# Patient Record
Sex: MALE | Race: WHITE | NOT HISPANIC OR LATINO | Employment: OTHER | URBAN - NONMETROPOLITAN AREA
[De-identification: names, ages, dates, MRNs, and addresses within clinical notes are randomized per-mention and may not be internally consistent; named-entity substitution may affect disease eponyms.]

---

## 2020-09-09 ENCOUNTER — CONSULT (OUTPATIENT)
Dept: CARDIOLOGY CLINIC | Facility: CLINIC | Age: 60
End: 2020-09-09
Payer: COMMERCIAL

## 2020-09-09 VITALS
HEART RATE: 81 BPM | SYSTOLIC BLOOD PRESSURE: 140 MMHG | WEIGHT: 137 LBS | TEMPERATURE: 96.7 F | DIASTOLIC BLOOD PRESSURE: 82 MMHG | HEIGHT: 71 IN | BODY MASS INDEX: 19.18 KG/M2

## 2020-09-09 DIAGNOSIS — R07.2 PRECORDIAL PAIN: ICD-10-CM

## 2020-09-09 DIAGNOSIS — I73.9 PERIPHERAL VASCULAR DISEASE (HCC): ICD-10-CM

## 2020-09-09 DIAGNOSIS — Z82.49 FAMILY HISTORY OF CORONARY ARTERY DISEASE: Primary | ICD-10-CM

## 2020-09-09 DIAGNOSIS — I10 ESSENTIAL HYPERTENSION: ICD-10-CM

## 2020-09-09 PROCEDURE — 99244 OFF/OP CNSLTJ NEW/EST MOD 40: CPT | Performed by: INTERNAL MEDICINE

## 2020-09-09 PROCEDURE — 93000 ELECTROCARDIOGRAM COMPLETE: CPT | Performed by: INTERNAL MEDICINE

## 2020-09-09 RX ORDER — ASPIRIN 81 MG/1
81 TABLET ORAL DAILY
COMMUNITY
End: 2022-06-12

## 2020-09-09 RX ORDER — LISINOPRIL 30 MG/1
30 TABLET ORAL DAILY
COMMUNITY

## 2020-09-09 NOTE — PROGRESS NOTES
Cardiology Office Visit    Lester Smith  [id number]  1960  [facility]  [address] [place] [address] [phone]      Dear Cesario Thomason MD,    I had the pleasure of seeing your patient at our [address] [place] [address] office today 9/9/2020  As you know he is a pleasant 56y o  year old male with a medical history as described below  Reason for office visit: Evaluation for underlying heart disease  History of PVD s/p bilateral iliac stenting and hypertension  1  Family history of coronary artery disease  Assessment & Plan:  Multiple family members with coronary artery disease  Would recommend aggressive risk factor modification  Orders:  -     POCT ECG  -     Echo complete with contrast if indicated; Future; Expected date: 09/09/2020  -     Echo stress test w contrast if indicated; Future; Expected date: 09/09/2020    2  Precordial pain  Assessment & Plan:  Patient reports intermittent sharp left sided chest pain which are short lived and resolve spontaneously  Strong family history of coronary artery disease  Risk factors for CAD include PVD, HTN and ongoing tobacco use  I have recommended echocardiogram and stress echocardiogram        Orders:  -     Echo complete with contrast if indicated; Future; Expected date: 09/09/2020  -     Echo stress test w contrast if indicated; Future; Expected date: 09/09/2020    3  Peripheral vascular disease [facility])  Assessment & Plan:  Patient with history of peripheral vascular disease  Patient underwent stenting of the bilateral iliac arteries  Patient follows with Dr Candi Betancourt at [facility]  Patient denies any claudication symptoms  Patient should have eventual carotid ultrasound and abdominal ultrasound  Ideally LDL should be < 70 given history of PVD  LDL 8/6/2020 85  Will discuss adding low dose statin at follow up         Orders:  -     Echo complete with contrast if indicated; Future; Expected date: 2020  -     Echo stress test w contrast if indicated; Future; Expected date: 2020    4  Essential hypertension  Assessment & Plan:  Patient is on lisinopril 30 mg daily  Blood pressure is typically well controlled per patient report  Will assess heart rate and blood pressure response to exercise at time of stress echocardiogram    Echocardiogram to assess wall thickness  Orders:  -     Echo complete with contrast if indicated; Future; Expected date: 2020  -     Echo stress test w contrast if indicated; Future; Expected date: 2020           HPI     Patient has a past medical history of hypertension, peripheral vascular disease s/p bilateral iliac stenting, BPH, COPD and osteoarthritis  Patient presents for evaluation of underlying heart disease  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  He walks a lot during the day  He does have occasional sharp stabbing left sided pain without pressure or tightness  He does have some dyspnea on exertion which he attributes to his COPD  He denies any palpitations  No LH/D  No edema  No recurrent claudication since iliac stenting  Energy levels ok  His wife tells him he snores  Patient follows with Dr Janette Celeste at [address] for his peripheral vascular disease  Patient has a strong family history of premature coronary artery disease and is concerned about same  Grandfather  at age 61 of MI, brother  of MI at age 61 and another brother  at the age of 59 from MI  He denies any prior cardiac work up such as stress test or echocardiogram      Patient tells me that he had marked claudication prior to iliac stent and that his feet would turn purple and would feel numb and cold  Patient only takes aspirin 81 mg as well as lisinopril 30 mg for blood pressure  He continues to smoke about one pack of cigarettes per day         Patient Active Problem List   Diagnosis    Family history of coronary artery disease    Hypertension    Precordial pain    Peripheral vascular disease (HCC)    Osteoarthritis    COPD (chronic obstructive pulmonary disease) (HCC)    BPH (benign prostatic hyperplasia)     Past Medical History:   Diagnosis Date    BPH (benign prostatic hyperplasia)     COPD (chronic obstructive pulmonary disease) (HCC)     Hypertension     Osteoarthritis     Peripheral vascular disease (HCC)     stenting b/l iliac stenting 8/12/2019    Severe epistaxis      Social History     Socioeconomic History    Marital status: /Civil Union     Spouse name: Not on file    Number of children: Not on file    Years of education: Not on file    Highest education level: Not on file   Occupational History    Not on file   Social Needs    Financial resource strain: Not on file    Food insecurity     Worry: Not on file     Inability: Not on file    Transportation needs     Medical: Not on file     Non-medical: Not on file   Tobacco Use    Smoking status: Current Every Day Smoker     Packs/day: 1 00     Types: Cigarettes    Smokeless tobacco: Never Used   Substance and Sexual Activity    Alcohol use: Yes     Frequency: 4 or more times a week     Drinks per session: 5 or 6     Comment: 5-6 beers a day    Drug use: Never    Sexual activity: Not on file     Comment: Defer   Lifestyle    Physical activity     Days per week: Not on file     Minutes per session: Not on file    Stress: Not on file   Relationships    Social connections     Talks on phone: Not on file     Gets together: Not on file     Attends [employer] service: Not on file     Active member of club or organization: Not on file     Attends meetings of clubs or organizations: Not on file     Relationship status: Not on file    Intimate partner violence     Fear of current or ex partner: Not on file     Emotionally abused: Not on file     Physically abused: Not on file     Forced sexual activity: Not on file   Other Topics Concern    Not on file   Social History Narrative    Not on file      Family History   Problem Relation Age of Onset    Lymphoma Mother     Colon cancer Father     Heart attack Brother     Heart attack Paternal Grandfather          61    Heart attack Brother     Substance Abuse Brother      Past Surgical History:   Procedure Laterality Date    HERNIA REPAIR      ILIAC VEIN ANGIOPLASTY / STENTING Bilateral 2019    Dr Mel Sears at [address]   NOSE SURGERY      arterial clip due to epistaxis    SKIN CANCER EXCISION      VASECTOMY         Current Outpatient Medications:     aspirin (ECOTRIN LOW STRENGTH) 81 mg EC tablet, Take 81 mg by mouth daily, Disp: , Rfl:     lisinopril (ZESTRIL) 30 mg tablet, Take 30 mg by mouth daily, Disp: , Rfl:   No Known Allergies      Test Results:    EC2020: Normal sinus rhythm  Normal EKG  Lipid panel 2020: C 198  T 66  H 98  L 85  Review of Systems:    Review of Systems   Constitutional: Negative for activity change, appetite change and fatigue  HENT: Negative for congestion, hearing loss, tinnitus and trouble swallowing  Eyes: Negative for visual disturbance  Respiratory: Positive for shortness of breath (dyspnea exertion)  Negative for cough, chest tightness and wheezing  Cardiovascular: Positive for chest pain  Negative for palpitations and leg swelling  Gastrointestinal: Negative for abdominal distention, abdominal pain, nausea and vomiting  Genitourinary: Negative for difficulty urinating  Musculoskeletal: Positive for arthralgias  Skin: Negative for rash  Neurological: Negative for dizziness, syncope and light-headedness  Hematological: Does not bruise/bleed easily  Psychiatric/Behavioral: Negative for confusion  The patient is not nervous/anxious  All other systems reviewed and are negative          Vitals:    20 0820 20 0859   BP: 132/80 140/82   BP Location: Left arm Left arm   Patient Position: Sitting Cuff Size: Standard    Pulse: 81    Temp: (!) 96 7 °F (35 9 °C)    Weight: 62 1 kg (137 lb)    Height: 5' 11" (1 803 m)      Vitals:    09/09/20 0820   Weight: 62 1 kg (137 lb)     Height: 5' 11" (180 3 cm)     Physical Exam   Constitutional: He is oriented to person, place, and time  He appears well-developed and well-nourished  HENT:   Head: Normocephalic and atraumatic  Eyes: Pupils are equal, round, and reactive to light  Conjunctivae are normal    Neck: Normal range of motion  No JVD present  Cardiovascular: Normal rate, regular rhythm and normal heart sounds  Exam reveals no gallop and no friction rub  No murmur heard  Pulmonary/Chest: Effort normal  He has decreased breath sounds  Abdominal: Soft  Bowel sounds are normal    Musculoskeletal:         General: No edema  Neurological: He is alert and oriented to person, place, and time  Skin: Skin is warm and dry  Psychiatric: He has a normal mood and affect  His behavior is normal    Vitals reviewed

## 2020-09-09 NOTE — PATIENT INSTRUCTIONS
Stress echocardiogram and echocardiogram    Eventual carotid and abdominal ultrasound unless done already  Will review records from Dr Nate Simmons as well from [facility] before making any further recommendations

## 2020-09-11 NOTE — ASSESSMENT & PLAN NOTE
Multiple family members with coronary artery disease  Would recommend aggressive risk factor modification

## 2020-09-11 NOTE — ASSESSMENT & PLAN NOTE
Patient reports intermittent sharp left sided chest pain which are short lived and resolve spontaneously  Strong family history of coronary artery disease  Risk factors for CAD include PVD, HTN and ongoing tobacco use     I have recommended echocardiogram and stress echocardiogram

## 2020-09-11 NOTE — ASSESSMENT & PLAN NOTE
Patient with history of peripheral vascular disease  Patient underwent stenting of the bilateral iliac arteries  Patient follows with Dr Candi Betancourt at [address]  Patient denies any claudication symptoms  Patient should have eventual carotid ultrasound and abdominal ultrasound  Ideally LDL should be < 70 given history of PVD  LDL 8/6/2020 85  Will discuss adding low dose statin at follow up

## 2020-09-11 NOTE — ASSESSMENT & PLAN NOTE
Patient is on lisinopril 30 mg daily  Blood pressure is typically well controlled per patient report  Will assess heart rate and blood pressure response to exercise at time of stress echocardiogram    Echocardiogram to assess wall thickness

## 2020-10-22 ENCOUNTER — HOSPITAL ENCOUNTER (OUTPATIENT)
Dept: NON INVASIVE DIAGNOSTICS | Facility: HOSPITAL | Age: 60
Discharge: HOME | End: 2020-10-22
Attending: INTERNAL MEDICINE
Payer: COMMERCIAL

## 2020-10-22 ENCOUNTER — APPOINTMENT (OUTPATIENT)
Dept: NON INVASIVE DIAGNOSTICS | Facility: HOSPITAL | Age: 60
End: 2020-10-22
Attending: INTERNAL MEDICINE
Payer: COMMERCIAL

## 2020-10-22 DIAGNOSIS — R07.2 PRECORDIAL PAIN: ICD-10-CM

## 2020-10-22 DIAGNOSIS — I73.9 PERIPHERAL VASCULAR DISEASE (HCC): ICD-10-CM

## 2020-10-22 DIAGNOSIS — Z82.49 FAMILY HISTORY OF CORONARY ARTERY DISEASE: ICD-10-CM

## 2020-10-22 DIAGNOSIS — I10 ESSENTIAL HYPERTENSION: ICD-10-CM

## 2020-10-22 PROCEDURE — 93350 STRESS TTE ONLY: CPT

## 2020-10-29 LAB
ARRHY DURING EX: NORMAL
CHEST PAIN STATEMENT: NORMAL
MAX DIASTOLIC BP: 90 MMHG
MAX HEART RATE: 164 BPM
MAX PREDICTED HEART RATE: 158 BPM
MAX. SYSTOLIC BP: 180 MMHG
PROTOCOL NAME: NORMAL
REASON FOR TERMINATION: NORMAL
TARGET HR FORMULA: NORMAL
TIME IN EXERCISE PHASE: NORMAL

## 2020-11-03 ENCOUNTER — TELEPHONE (OUTPATIENT)
Dept: CARDIOLOGY CLINIC | Facility: CLINIC | Age: 60
End: 2020-11-03

## 2020-11-04 DIAGNOSIS — I49.3 PVC'S (PREMATURE VENTRICULAR CONTRACTIONS): Primary | ICD-10-CM

## 2020-11-18 ENCOUNTER — OFFICE VISIT (OUTPATIENT)
Dept: CARDIOLOGY CLINIC | Facility: CLINIC | Age: 60
End: 2020-11-18
Payer: COMMERCIAL

## 2020-11-18 VITALS
WEIGHT: 139 LBS | HEIGHT: 72 IN | OXYGEN SATURATION: 100 % | TEMPERATURE: 96.2 F | BODY MASS INDEX: 18.83 KG/M2 | HEART RATE: 84 BPM | DIASTOLIC BLOOD PRESSURE: 80 MMHG | SYSTOLIC BLOOD PRESSURE: 142 MMHG

## 2020-11-18 DIAGNOSIS — I73.9 PERIPHERAL VASCULAR DISEASE (HCC): ICD-10-CM

## 2020-11-18 DIAGNOSIS — I49.1 ATRIAL ECTOPY: ICD-10-CM

## 2020-11-18 DIAGNOSIS — Z82.49 FAMILY HISTORY OF CORONARY ARTERY DISEASE: Primary | ICD-10-CM

## 2020-11-18 DIAGNOSIS — R07.2 PRECORDIAL PAIN: ICD-10-CM

## 2020-11-18 DIAGNOSIS — I10 ESSENTIAL HYPERTENSION: ICD-10-CM

## 2020-11-18 PROBLEM — I49.3 PVC'S (PREMATURE VENTRICULAR CONTRACTIONS): Status: ACTIVE | Noted: 2020-11-18

## 2020-11-18 PROCEDURE — 99214 OFFICE O/P EST MOD 30 MIN: CPT | Performed by: INTERNAL MEDICINE

## 2020-11-18 RX ORDER — GARLIC 200 MG
6000 TABLET ORAL 3 TIMES DAILY
Status: ON HOLD | COMMUNITY
End: 2022-06-12 | Stop reason: CLARIF

## 2020-11-18 RX ORDER — MULTIVITAMIN WITH IRON
250 TABLET ORAL DAILY
Qty: 30 TABLET | Refills: 0 | Status: ON HOLD
Start: 2020-11-18 | End: 2022-06-12 | Stop reason: CLARIF

## 2020-11-18 RX ORDER — DIPHENOXYLATE HYDROCHLORIDE AND ATROPINE SULFATE 2.5; .025 MG/1; MG/1
1 TABLET ORAL DAILY
COMMUNITY

## 2020-11-29 PROBLEM — I49.1 ATRIAL ECTOPY: Status: ACTIVE | Noted: 2020-11-18

## 2020-12-02 ENCOUNTER — CLINICAL SUPPORT (OUTPATIENT)
Dept: CARDIOLOGY CLINIC | Facility: CLINIC | Age: 60
End: 2020-12-02
Payer: COMMERCIAL

## 2020-12-02 DIAGNOSIS — I49.3 PVC'S (PREMATURE VENTRICULAR CONTRACTIONS): ICD-10-CM

## 2020-12-02 PROCEDURE — 0298T PR EXT ECG > 48HR TO 21 DAY REVIEW AND INTERPRETATN: CPT

## 2020-12-02 PROCEDURE — 99211 OFF/OP EST MAY X REQ PHY/QHP: CPT

## 2020-12-10 DIAGNOSIS — I49.3 PVC'S (PREMATURE VENTRICULAR CONTRACTIONS): ICD-10-CM

## 2020-12-10 DIAGNOSIS — R00.0 TACHYCARDIA: Primary | ICD-10-CM

## 2020-12-11 DIAGNOSIS — I10 ESSENTIAL HYPERTENSION: Primary | ICD-10-CM

## 2020-12-11 RX ORDER — METOPROLOL SUCCINATE 25 MG/1
25 TABLET, EXTENDED RELEASE ORAL DAILY
Qty: 30 TABLET | Refills: 11 | OUTPATIENT
Start: 2020-12-11

## 2020-12-11 RX ORDER — METOPROLOL SUCCINATE 25 MG/1
25 TABLET, EXTENDED RELEASE ORAL DAILY
Status: ON HOLD | COMMUNITY
End: 2022-06-12 | Stop reason: CLARIF

## 2020-12-11 RX ORDER — METOPROLOL SUCCINATE 25 MG/1
25 TABLET, EXTENDED RELEASE ORAL DAILY
Qty: 30 TABLET | Refills: 5 | Status: SHIPPED | OUTPATIENT
Start: 2020-12-11 | End: 2022-06-12

## 2020-12-15 ENCOUNTER — TELEPHONE (OUTPATIENT)
Dept: CARDIOLOGY CLINIC | Facility: CLINIC | Age: 60
End: 2020-12-15

## 2020-12-18 ENCOUNTER — TELEPHONE (OUTPATIENT)
Dept: CARDIOLOGY CLINIC | Facility: CLINIC | Age: 60
End: 2020-12-18

## 2021-02-10 DIAGNOSIS — R00.0 TACHYCARDIA: Primary | ICD-10-CM

## 2021-03-22 ENCOUNTER — CLINICAL SUPPORT (OUTPATIENT)
Dept: CARDIOLOGY CLINIC | Facility: CLINIC | Age: 61
End: 2021-03-22
Payer: COMMERCIAL

## 2021-03-22 DIAGNOSIS — R00.0 TACHYCARDIA: ICD-10-CM

## 2021-03-22 DIAGNOSIS — R07.2 PRECORDIAL PAIN: ICD-10-CM

## 2021-03-22 PROCEDURE — 93244 EXT ECG>48HR<7D REV&INTERPJ: CPT | Performed by: INTERNAL MEDICINE

## 2021-03-23 ENCOUNTER — TELEPHONE (OUTPATIENT)
Dept: CARDIOLOGY CLINIC | Facility: CLINIC | Age: 61
End: 2021-03-23

## 2021-03-23 NOTE — TELEPHONE ENCOUNTER
Pt states that he never started the metoprolol  States that he was taking a "nitric oxide supplement" since January  States that Dr Marlon Holcomb advised this       Asking for a copy of zio report

## 2021-03-23 NOTE — TELEPHONE ENCOUNTER
----- Message from Leigh Boyer DO sent at 3/23/2021 12:57 PM EDT -----  Patient had a min HR of 61 bpm, max HR of 231 bpm, and avg HR of 86  bpm  Predominant underlying rhythm was Sinus Rhythm  8  Supraventricular Tachycardia runs occurred, the run with the fastest  interval lasting 8 beats with a max rate of 231 bpm, the longest lasting 8  beats with an avg rate of 119 bpm  Isolated SVEs were occasional (1 5%,  3159), SVE Couplets were rare (<1 0%, 13), and SVE Triplets were rare  (<1 0%, 5)  Isolated VEs were rare (<1 0%), and no VE Couplets or VE  Triplets were present  ZIO 3/8-3/10/21:1 day and 18 hours  Min HR 61  Max   Avg HR 86    8 SVT runs with the longest lasting 8 beats  Occasional PAC's (1 5%)  Rare PVC's     ##  Please call the patient and let him know that his monitor showed predominantly normal sinus rhythm  He did have 8 short episodes of SVT ( rapid heart rates coming from the top chambers of the heart ) which only lasted approximately 8 beats  He had occasional PACs /premature atrial complexes which are early/premature beats from the top chamber of the heart  No change in medications for now  Please let him know that we will discuss details at follow-up  Thank you

## 2021-03-24 NOTE — TELEPHONE ENCOUNTER
Ok  I would recommend that he start metoprolol and magnesium  If he does not wish to do so that is certainly his decision  Thank you

## 2021-06-17 ENCOUNTER — TELEPHONE (OUTPATIENT)
Dept: UROLOGY | Facility: CLINIC | Age: 61
End: 2021-06-17

## 2021-06-17 NOTE — TELEPHONE ENCOUNTER
Pt called to cx f/u appt  Explained he no longer requires f/u since he started taking OTC supplements instead of the metoprolol   Pt prefers those meds instead of dealing with all of the side effects of prescription meds

## 2021-06-17 NOTE — TELEPHONE ENCOUNTER
KP Tucker called requesting pt to be moved sooner than July 6 due to recent imagining findings  Discussed with pulmonary PA Petra Nascimento, reviewed result  Findings suggest further testing in three months   Offered pt sooner appt, pt prefers to keep scheduled appt

## 2022-06-11 ENCOUNTER — HOSPITAL ENCOUNTER (EMERGENCY)
Facility: HOSPITAL | Age: 62
Discharge: HOME | End: 2022-06-11
Attending: EMERGENCY MEDICINE | Admitting: EMERGENCY MEDICINE
Payer: COMMERCIAL

## 2022-06-11 ENCOUNTER — HOSPITAL ENCOUNTER (OUTPATIENT)
Facility: HOSPITAL | Age: 62
Setting detail: OBSERVATION
Discharge: HOME | End: 2022-06-12
Attending: EMERGENCY MEDICINE | Admitting: OTOLARYNGOLOGY
Payer: COMMERCIAL

## 2022-06-11 VITALS
OXYGEN SATURATION: 98 % | TEMPERATURE: 99.3 F | DIASTOLIC BLOOD PRESSURE: 76 MMHG | SYSTOLIC BLOOD PRESSURE: 164 MMHG | HEART RATE: 80 BPM | RESPIRATION RATE: 16 BRPM

## 2022-06-11 VITALS
TEMPERATURE: 98.8 F | HEART RATE: 92 BPM | DIASTOLIC BLOOD PRESSURE: 98 MMHG | HEIGHT: 72 IN | WEIGHT: 130 LBS | OXYGEN SATURATION: 97 % | BODY MASS INDEX: 17.61 KG/M2 | SYSTOLIC BLOOD PRESSURE: 177 MMHG | RESPIRATION RATE: 18 BRPM

## 2022-06-11 DIAGNOSIS — I10 PRIMARY HYPERTENSION: ICD-10-CM

## 2022-06-11 DIAGNOSIS — R04.0 EPISTAXIS: Primary | ICD-10-CM

## 2022-06-11 DIAGNOSIS — E87.1 HYPONATREMIA: ICD-10-CM

## 2022-06-11 LAB
ANION GAP SERPL CALCULATED.3IONS-SCNC: 10 MMOL/L (ref 4–13)
APTT PPP: 31 SECONDS (ref 23–37)
BASOPHILS # BLD AUTO: 0.02 THOUSANDS/ΜL (ref 0–0.1)
BASOPHILS NFR BLD AUTO: 0 % (ref 0–1)
BUN SERPL-MCNC: 11 MG/DL (ref 5–25)
CALCIUM SERPL-MCNC: 8.6 MG/DL (ref 8.3–10.1)
CHLORIDE SERPL-SCNC: 92 MMOL/L (ref 100–108)
CO2 SERPL-SCNC: 25 MMOL/L (ref 21–32)
CREAT SERPL-MCNC: 1.23 MG/DL (ref 0.6–1.3)
EOSINOPHIL # BLD AUTO: 0.11 THOUSAND/ΜL (ref 0–0.61)
EOSINOPHIL NFR BLD AUTO: 2 % (ref 0–6)
ERYTHROCYTE [DISTWIDTH] IN BLOOD BY AUTOMATED COUNT: 13.6 % (ref 11.6–15.1)
GFR SERPL CREATININE-BSD FRML MDRD: 62 ML/MIN/1.73SQ M
GLUCOSE SERPL-MCNC: 101 MG/DL (ref 65–140)
HCT VFR BLD AUTO: 37.6 % (ref 36.5–49.3)
HGB BLD-MCNC: 13.7 G/DL (ref 12–17)
IMM GRANULOCYTES # BLD AUTO: 0.03 THOUSAND/UL (ref 0–0.2)
IMM GRANULOCYTES NFR BLD AUTO: 1 % (ref 0–2)
INR PPP: 0.94 (ref 0.84–1.19)
LYMPHOCYTES # BLD AUTO: 0.65 THOUSANDS/ΜL (ref 0.6–4.47)
LYMPHOCYTES NFR BLD AUTO: 12 % (ref 14–44)
MCH RBC QN AUTO: 34.1 PG (ref 26.8–34.3)
MCHC RBC AUTO-ENTMCNC: 36.4 G/DL (ref 31.4–37.4)
MCV RBC AUTO: 94 FL (ref 82–98)
MONOCYTES # BLD AUTO: 0.64 THOUSAND/ΜL (ref 0.17–1.22)
MONOCYTES NFR BLD AUTO: 12 % (ref 4–12)
NEUTROPHILS # BLD AUTO: 3.82 THOUSANDS/ΜL (ref 1.85–7.62)
NEUTS SEG NFR BLD AUTO: 73 % (ref 43–75)
NRBC BLD AUTO-RTO: 0 /100 WBCS
PLATELET # BLD AUTO: 307 THOUSANDS/UL (ref 149–390)
PMV BLD AUTO: 8.6 FL (ref 8.9–12.7)
POTASSIUM SERPL-SCNC: 4.2 MMOL/L (ref 3.5–5.3)
PROTHROMBIN TIME: 12.5 SECONDS (ref 11.6–14.5)
RBC # BLD AUTO: 4.02 MILLION/UL (ref 3.88–5.62)
SODIUM SERPL-SCNC: 127 MMOL/L (ref 136–145)
WBC # BLD AUTO: 5.27 THOUSAND/UL (ref 4.31–10.16)

## 2022-06-11 PROCEDURE — 36415 COLL VENOUS BLD VENIPUNCTURE: CPT | Performed by: EMERGENCY MEDICINE

## 2022-06-11 PROCEDURE — 99283 EMERGENCY DEPT VISIT LOW MDM: CPT

## 2022-06-11 PROCEDURE — 99284 EMERGENCY DEPT VISIT MOD MDM: CPT | Performed by: EMERGENCY MEDICINE

## 2022-06-11 PROCEDURE — 80048 BASIC METABOLIC PNL TOTAL CA: CPT | Performed by: EMERGENCY MEDICINE

## 2022-06-11 PROCEDURE — 99243 OFF/OP CNSLTJ NEW/EST LOW 30: CPT | Performed by: NURSE PRACTITIONER

## 2022-06-11 PROCEDURE — 99284 EMERGENCY DEPT VISIT MOD MDM: CPT

## 2022-06-11 PROCEDURE — 99285 EMERGENCY DEPT VISIT HI MDM: CPT | Performed by: EMERGENCY MEDICINE

## 2022-06-11 PROCEDURE — 85730 THROMBOPLASTIN TIME PARTIAL: CPT | Performed by: EMERGENCY MEDICINE

## 2022-06-11 PROCEDURE — 85610 PROTHROMBIN TIME: CPT | Performed by: EMERGENCY MEDICINE

## 2022-06-11 PROCEDURE — 85025 COMPLETE CBC W/AUTO DIFF WBC: CPT | Performed by: EMERGENCY MEDICINE

## 2022-06-11 RX ORDER — GINSENG 100 MG
1 CAPSULE ORAL 2 TIMES DAILY
Status: DISCONTINUED | OUTPATIENT
Start: 2022-06-11 | End: 2022-06-11 | Stop reason: HOSPADM

## 2022-06-11 RX ORDER — CEFUROXIME AXETIL 500 MG/1
500 TABLET ORAL EVERY 12 HOURS SCHEDULED
Qty: 10 TABLET | Refills: 0 | Status: SHIPPED | OUTPATIENT
Start: 2022-06-11 | End: 2022-06-16

## 2022-06-11 RX ORDER — ACETAMINOPHEN 325 MG/1
650 TABLET ORAL EVERY 6 HOURS PRN
Status: DISCONTINUED | OUTPATIENT
Start: 2022-06-11 | End: 2022-06-12 | Stop reason: HOSPADM

## 2022-06-11 RX ORDER — COCAINE HYDROCHLORIDE 40 MG/ML
SOLUTION NASAL ONCE
Status: COMPLETED | OUTPATIENT
Start: 2022-06-11 | End: 2022-06-11

## 2022-06-11 RX ORDER — CLONIDINE HYDROCHLORIDE 0.1 MG/1
0.2 TABLET ORAL ONCE
Status: COMPLETED | OUTPATIENT
Start: 2022-06-11 | End: 2022-06-11

## 2022-06-11 RX ORDER — ECHINACEA PURPUREA EXTRACT 125 MG
2 TABLET ORAL 4 TIMES DAILY
Status: DISCONTINUED | OUTPATIENT
Start: 2022-06-11 | End: 2022-06-12

## 2022-06-11 RX ORDER — LABETALOL HYDROCHLORIDE 5 MG/ML
10 INJECTION, SOLUTION INTRAVENOUS EVERY 6 HOURS PRN
Status: DISCONTINUED | OUTPATIENT
Start: 2022-06-11 | End: 2022-06-12 | Stop reason: HOSPADM

## 2022-06-11 RX ORDER — ONDANSETRON 2 MG/ML
4 INJECTION INTRAMUSCULAR; INTRAVENOUS ONCE AS NEEDED
Status: COMPLETED | OUTPATIENT
Start: 2022-06-11 | End: 2022-06-12

## 2022-06-11 RX ORDER — DEXTROSE AND SODIUM CHLORIDE 5; .45 G/100ML; G/100ML
75 INJECTION, SOLUTION INTRAVENOUS CONTINUOUS
Status: DISCONTINUED | OUTPATIENT
Start: 2022-06-11 | End: 2022-06-12

## 2022-06-11 RX ORDER — HYDROCODONE BITARTRATE AND ACETAMINOPHEN 5; 325 MG/1; MG/1
1 TABLET ORAL EVERY 4 HOURS PRN
Status: DISCONTINUED | OUTPATIENT
Start: 2022-06-11 | End: 2022-06-12 | Stop reason: HOSPADM

## 2022-06-11 RX ORDER — GINSENG 100 MG
1 CAPSULE ORAL ONCE
Status: COMPLETED | OUTPATIENT
Start: 2022-06-11 | End: 2022-06-11

## 2022-06-11 RX ORDER — GINSENG 100 MG
1 CAPSULE ORAL 2 TIMES DAILY
Status: DISCONTINUED | OUTPATIENT
Start: 2022-06-11 | End: 2022-06-11

## 2022-06-11 RX ORDER — CEFUROXIME AXETIL 250 MG/1
500 TABLET ORAL EVERY 12 HOURS SCHEDULED
Status: DISCONTINUED | OUTPATIENT
Start: 2022-06-11 | End: 2022-06-12 | Stop reason: HOSPADM

## 2022-06-11 RX ADMIN — BACITRACIN 1 LARGE APPLICATION: 500 OINTMENT TOPICAL at 19:55

## 2022-06-11 RX ADMIN — COCAINE HYDROCHLORIDE: 40 SOLUTION NASAL at 06:57

## 2022-06-11 RX ADMIN — CLONIDINE HYDROCHLORIDE 0.2 MG: 0.1 TABLET ORAL at 14:14

## 2022-06-11 RX ADMIN — BACITRACIN 1 LARGE APPLICATION: 500 OINTMENT TOPICAL at 07:01

## 2022-06-11 RX ADMIN — COCAINE HYDROCHLORIDE 4 ML: 40 SOLUTION NASAL at 19:29

## 2022-06-11 RX ADMIN — SILVER NITRATE APPLICATORS 1 APPLICATOR: 25; 75 STICK TOPICAL at 07:01

## 2022-06-11 RX ADMIN — LABETALOL HYDROCHLORIDE 10 MG: 5 INJECTION, SOLUTION INTRAVENOUS at 23:24

## 2022-06-11 RX ADMIN — SILVER NITRATE APPLICATORS 1 APPLICATOR: 25; 75 STICK TOPICAL at 20:02

## 2022-06-11 RX ADMIN — COCAINE HYDROCHLORIDE: 40 SOLUTION NASAL at 15:31

## 2022-06-11 RX ADMIN — SILVER NITRATE APPLICATORS 1 APPLICATOR: 25; 75 STICK TOPICAL at 15:31

## 2022-06-11 RX ADMIN — DEXTROSE AND SODIUM CHLORIDE 75 ML/HR: 5; .45 INJECTION, SOLUTION INTRAVENOUS at 22:00

## 2022-06-11 NOTE — CONSULTS
S:  Patient presents back to the emergency room with an episode again of left-sided epistaxis  It was short lasting  He has had significant past history of nasal and sinus surgery for epistaxis at [facility] several years ago  He has had intermittent epistaxis since then  He has been followed by in our office but has not been seen for several months  Is epistaxis again was left-sided  He did have a 4 5 cm Merocel placed in the superior portion of the nose this morning on the left side the bleeding seemed to decrease and stop with the use of Afrin nasal spray in the left nostril  He has been keeping the Merocel sponge moist with saline spray  CBC revealed a hemoglobin of 13 7  PT and PTT were normal   Platelet count was also normal   He is on a daily baby aspirin  He did not take any today  He was hypertensive when he came to the emergency room  O:  Merocel packing in place in the superior left nasal cavity with no active bleeding  It is blood tinged  There was no evidence of blood in the right nasal cavity with the packing also visible through the septal perforation  Procedure:  Topical cocaine was then placed under the Merocel sponge and on the anterior septal area just anterior to the perforation  It was left for several minutes  It was then removed  It had been applied with cotton balls  There was some cautery performed and the anterior septum near the edge of the perforation on the left side  Then bacitracin was used to  coat a partially trimmed Merocel sponge  The Merocel sponge was cut longitudinally  Next, with the use of bayonet forceps it was placed under the superiorly paced 4 5 cm Merocel sponge and then inflated with sterile saline  With this procedure, there is a Merocel sponge placed superiorly in the nasal cavity 1 placed inferiorly  Through visualization on the right side through the perforation both Merocel sponges are in good position    No active bleeding was noted on the right side  A:  Left-sided epistaxis possibly posterior or possibly related to the septal perforation  It was treated with a combination of silver nitrate cautery and 2 Merocel sponges  Plan:  He  Was prescribed Ceftin this morning at 250 mg twice daily for the next 5 days  He will keep the Merocel sponge inflated with Katey nasal spray 2 sprays 4 times daily  We will plan to see him on Monday for packing removal   At least, his hemoglobin is normal at 13 7    His coags were also normal

## 2022-06-11 NOTE — ED NOTES
Assumed pt care, pt is AAOx4, sitting up on stretcher  Scant amount of bleeding noted to gauze already in place on nose  Reinforced with additional gauze and tape  Awaiting ENT arrival (ETA 10-15 min)  Pt updated on plan of care  Family remains at bedside  No needs at present time        Marissa Rosario, RN  06/11/22 [id number]

## 2022-06-11 NOTE — ED NOTES
Dr Tru Prabhakar at bedside     [facility] [address]  06/11/22 1930

## 2022-06-11 NOTE — ED NOTES
Patient discharged to home   Verbalized understanding of discharge instructions and need for follow up care     Marquez Snyder RN  06/11/22 5199

## 2022-06-11 NOTE — ED PROVIDER NOTES
History  Chief Complaint   Patient presents with    Nose Bleed     Pt back again for 3rd time in 24 hrs for nose bleed  Denies dizziness  History provided by:  Medical records and patient  Nose Bleed  Location:  L nare  Severity:  Moderate  Timing:  Constant  Progression:  Resolved  Chronicity:  Recurrent  Context comment:  See previous note, patient was discharged from the emergency room, within the past 1/2 hour had rebleeding from the left nare, patient has packing in place from ENT, patient also placed in Afrin-soaked tissue to the left nare  Relieved by:  Nasal tampon  Worsened by:  Nothing  Ineffective treatments:  None tried  Associated symptoms: no cough, no dizziness, no fever, no headaches and no sore throat        Prior to Admission Medications   Prescriptions Last Dose Informant Patient Reported? Taking?    Fluticasone-Salmeterol (ADVAIR DISKUS IN)   Yes Yes   Sig: Inhale   aspirin (ECOTRIN LOW STRENGTH) 81 mg EC tablet 6/11/2022 at Unknown time  Yes Yes   Sig: Take 81 mg by mouth daily   cefuroxime (CEFTIN) 500 mg tablet 6/11/2022 at Unknown time  No Yes   Sig: Take 1 tablet (500 mg total) by mouth every 12 (twelve) hours for 5 days   lisinopril (ZESTRIL) 30 mg tablet 6/11/2022 at Unknown time Self Yes Yes   Sig: Take 30 mg by mouth daily   metoprolol succinate (TOPROL-XL) 25 mg 24 hr tablet   No No   Sig: Take 1 tablet (25 mg total) by mouth daily   Patient not taking: Reported on 6/11/2022   multivitamin (THERAGRAN) TABS   Yes No   Sig: Take 1 tablet by mouth daily      Facility-Administered Medications: None       Past Medical History:   Diagnosis Date    Atrial tachycardia (HCC)     BPH (benign prostatic hyperplasia)     [facility]     Hypertension     Osteoarthritis     [facility] vascular [facility]     stenting b/l iliac stenting 8/12/2019    Severe epistaxis        Past Surgical History:   Procedure Laterality Date    HERNIA REPAIR      ILIAC VEIN ANGIOPLASTY / STENTING Bilateral 2019    Dr Mary Rodriguez [facility]   NOSE SURGERY      arterial clip due to epistaxis    SKIN CANCER EXCISION      VASECTOMY         Family History   Problem Relation Age of Onset    Lymphoma Mother     Colon cancer Father     Heart attack Brother     Heart attack Paternal Grandfather          61    Heart attack Brother     Substance Abuse Brother      I have reviewed and agree with the history as documented  E-Cigarette/Vaping    E-Cigarette Use Never User      E-Cigarette/Vaping Substances     Social History     Tobacco Use    Smoking status: Current Every Day Smoker     Packs/day: 1 00     Types: Cigarettes    Smokeless tobacco: Never Used   Vaping Use    Vaping Use: Never used   Substance Use Topics    Alcohol use: Yes     Comment: 5-6 beers a day    Drug use: Never       Review of Systems   Constitutional: Negative for appetite change, chills, fatigue and fever  HENT: Positive for nosebleeds  Negative for ear pain, rhinorrhea, sore throat and trouble swallowing  Eyes: Negative for pain, discharge and visual disturbance  Respiratory: Negative for cough, chest tightness and shortness of breath  Cardiovascular: Negative for chest pain and palpitations  Gastrointestinal: Negative for abdominal pain, nausea and vomiting  Genitourinary: Negative for difficulty urinating, dysuria, hematuria and testicular pain  Musculoskeletal: Negative for arthralgias and back pain  Skin: Negative for color change and rash  Neurological: Negative for dizziness, seizures, syncope, weakness and headaches  Hematological: Negative for adenopathy  Psychiatric/Behavioral: Negative for confusion and dysphoric mood  All other systems reviewed and are negative  Physical Exam  Physical Exam  Constitutional:       General: He is not in acute distress  Appearance: Normal appearance  He is not ill-appearing, toxic-appearing or diaphoretic     HENT: Head: Normocephalic and atraumatic  Nose: No congestion or rhinorrhea  Right Nostril: Epistaxis present  Comments: Left nare with tissue packed, blood saturated, dry blood on nares  Right nare packed with dry tissue     Mouth/Throat:      Mouth: Mucous membranes are moist       Pharynx: Oropharynx is clear  No oropharyngeal exudate or posterior oropharyngeal erythema  Eyes:      General:         Right eye: No discharge  Left eye: No discharge  Cardiovascular:      Rate and Rhythm: Normal rate and regular rhythm  Pulses: Normal pulses  Heart sounds: Normal heart sounds  No murmur heard  No gallop  Pulmonary:      Effort: Pulmonary effort is normal  No respiratory distress  Breath sounds: Normal breath sounds  No stridor  No wheezing, rhonchi or rales  Chest:      Chest wall: No tenderness  Abdominal:      General: Bowel sounds are normal  There is no distension  Palpations: Abdomen is soft  There is no mass  Tenderness: There is no abdominal tenderness  There is no right CVA tenderness, left CVA tenderness, guarding or rebound  Hernia: No hernia is present  Musculoskeletal:         General: Normal range of motion  Cervical back: Normal range of motion and neck supple  Skin:     General: Skin is warm and dry  Capillary Refill: Capillary refill takes less than 2 seconds  Neurological:      General: No focal deficit present  Mental Status: He is alert and oriented to person, place, and time  Cranial Nerves: No cranial nerve deficit  Sensory: No sensory deficit  Motor: No weakness  Coordination: Coordination normal       Gait: Gait normal       Deep Tendon Reflexes: Reflexes normal    Psychiatric:         Mood and Affect: Mood normal          Behavior: Behavior normal          Thought Content:  Thought content normal          Judgment: Judgment normal          Vital Signs  ED Triage Vitals   Temperature Pulse Respirations Blood Pressure SpO2   06/11/22 1824 06/11/22 1824 06/11/22 1824 06/11/22 1824 06/11/22 1824   98 2 °F (36 8 °C) 87 18 (!) 173/94 100 %      Temp Source Heart Rate Source Patient Position - Orthostatic VS BP Location FiO2 (%)   06/11/22 1824 06/11/22 1845 06/11/22 1824 06/11/22 1824 --   Temporal Right;Radial Sitting Right arm       Pain Score       06/11/22 1845       No Pain           Vitals:    06/12/22 1002 06/12/22 1017 06/12/22 1025 06/12/22 1521   BP: 142/85 151/88 154/80 146/80   Pulse: 88 90 85 84   Patient Position - Orthostatic VS:             Visual Acuity      ED Medications  Medications   cefuroxime (CEFTIN) tablet 500 mg (500 mg Oral Given 6/12/22 1058)   lisinopril (ZESTRIL) tablet 30 mg (30 mg Oral Given 6/12/22 1057)   acetaminophen (TYLENOL) tablet 650 mg (has no administration in time range)   HYDROcodone-acetaminophen (NORCO) 5-325 mg per tablet 1 tablet (has no administration in time range)   labetalol (NORMODYNE) injection 10 mg ( Intravenous MAR Hold 6/12/22 0850)   oxymetazoline (AFRIN) 0 05 % nasal spray 2 spray ( Each Nare MAR Hold 6/12/22 0850)   morphine injection 4 mg ( Intravenous MAR Hold 6/12/22 0850)   ondansetron (ZOFRAN) injection 4 mg (has no administration in time range)   sodium chloride (AYR SALINE NASAL) nasal gel 1 application (1 application Nasal Given 6/12/22 1704)   cocaine 40 mg/mL topical solution (4 mL Topical Given 6/11/22 1929)   bacitracin topical ointment 1 large application (1 large application Topical Given 6/11/22 1955)   silver nitrate-potassium nitrate (ARZOL SILVER NITRATE) 78-41 % applicator 1 applicator (1 applicator Topical Given 6/11/22 2002)   ondansetron (ZOFRAN) injection 4 mg (4 mg Intravenous Given 6/12/22 0927)   morphine injection 4 mg (4 mg Intravenous Given 6/12/22 0423)       Diagnostic Studies  Results Reviewed     None                 XR chest portable    (Results Pending)              Procedures  Procedures         ED Course  ED Course as of 06/12/22 2043   Sat Jun 11, 2022   1821 1821:  Patient with stigmata of recent epistaxis left side  I personally saw the patient earlier this afternoon for similar  He was treated by our ENT specialist   Had a rebleed within the past 1/2 hour, there is no active bleeding at this time  I will reach out to ENT again for assistance  1930 1930:  Dr Swati Kingsley at bedside assisting with care, plans to admit for observation  MDM    Disposition  Final diagnoses:   Epistaxis     Time reflects when diagnosis was documented in both MDM as applicable and the Disposition within this note     Time User Action Codes Description Comment    6/11/2022  7:16 PM Margarito Carlos [R04 0] Epistaxis     6/11/2022  8:24 PM Jose Donis Add [I10] Primary hypertension     6/12/2022 11:16 AM Anmol Saba Add [E87 1] Chronic hyponatremia       ED Disposition     ED Disposition   Admit    Condition   Stable    Date/Time   Sat Jun 11, 2022  7:16 PM    Comment              Follow-up Information     Follow up With Specialties Details Why Contact Info Additional Information    Du Shepherd MD Family Medicine Schedule an appointment as soon as possible for a visit in 1 week(s)  [address]       [address] [place] [address] Nephrology [facility] Nephrology Follow up Follow-up for low-sodium [facility] [id number]  [address] [place] [address] Nephrology [facility], [facility] [address] [facility], 2nd Floor, [facility] [place] [facility]    Gardenia Sever, MD Otolaryngology Schedule an appointment as soon as possible for a visit in 5 day(s) See me this Friday    Call the office tomorrow after 9:00 a m  for the appointment [address]  [address] [facility]  [phone]             Discharge Medication List as of 6/12/2022  7:07 PM      CONTINUE these medications which have NOT CHANGED    Details   cefuroxime (CEFTIN) 500 mg tablet Take 1 tablet (500 mg total) by mouth every 12 (twelve) hours for 5 days, Starting Sat 6/11/2022, Until Thu 6/16/2022, Normal      Fluticasone-Salmeterol (ADVAIR DISKUS IN) Inhale, Historical Med      lisinopril (ZESTRIL) 30 mg tablet Take 30 mg by mouth daily, Historical Med      multivitamin (THERAGRAN) TABS Take 1 tablet by mouth daily, Historical Med         STOP taking these medications       aspirin (ECOTRIN LOW STRENGTH) 81 mg EC tablet Comments:   Reason for Stopping:         metoprolol succinate (TOPROL-XL) 25 mg 24 hr tablet Comments:   Reason for Stopping:               Outpatient Discharge Orders   Ambulatory Referral to Nephrology   Standing Status: Future Standing Exp   Date: 09/12/22      Discharge Diet     No strenuous exercise     Lifting restrictions     Other restrictions (specify):     Call provider for: active or persistent bleeding     No dressing needed       PDMP Review       Value Time User    PDMP Reviewed  Yes 6/12/2022  6:55 PM Valeri Dejesus MD          ED Provider  Electronically Signed by           Bill Alvarado MD  06/12/22 2044

## 2022-06-11 NOTE — CONSULTS
History:  The patient is a 57-year-old male well known to me it was had extensive past history of epistaxis  He had significant surgery at [facility] for severe epistaxis and has undergone endoscopic sinus surgery as well as surgery on the septum  He has a large septal perforation  He has been followed in my office for intermittent episodes of epistaxis  Today, he was awoken by blood pouring out of the nose on both sides  It seems that it started on the left side  Because of the hole the septum is often times difficult to tell exactly where the bleeding started but in this case the patient felt it was left-sided  He has been seen in my office multiple times in the past for intermittent epistaxis  This has been since his original procedure  ENT consultation was requested for evaluation    Allergies:  None    Meds:  Metoprolol  Sildenafil  Aspirin  Lisinopril  Past medical history:  Hypertension  Past surgical history:  Nose and sinuses as noted above    Family history:  Noncontributory    Social history:  Notable for smoking  Review of systems:  Noncontributory    Physical exam:  Reveals a well-developed male in no apparent distress  Head:  Normocephalic/atraumatic  Neck:  No palpable adenopathy or thyromegaly  Ears:  TMs and canals clear  Nose:  Large nasal septal perforation with clot noted in the left nasal cavity  It was suction debrided and topical cocaine on cotton balls was placed into the left nasal cavity as well as superior right nasal cavity  After sufficient time take place, the nasal cotton balls were removed and cauterization along the middle turbinate as well as along the septal perforation posteriorly and superiorly was completed with silver nitrate  There was also some cauterization done on the right middle turbinate with silver nitrate    Because the epistaxis seemed to start in the left side there was some areas of blood on the left side consistent with this being the primary source, a 4 cm Merocel sponge was placed superiorly in the left nasal cavity  It was treated with topical bacitracin and then inflated with sterile saline  Oral cavity///oropharynx:  Clear  Nasopharynx/larynx not visualized    Impression 1  Left-sided epistaxis complicated by nasal septal perforation and previous endoscopic sinus surgery  Plan: We will have him use saline nasal spray at least 4 times daily in the left nostril to keep the Merocel sponge inflated  We will also place him on Ceftin 250 mg twice daily for the next 5 days  He will see us in the office on Monday for packing removal   He can take Tylenol as needed for pain  If he has any recurrence of epistaxis, he knows to contact us and come back to the emergency room

## 2022-06-11 NOTE — ED PROVIDER NOTES
History  Chief Complaint   Patient presents with    Nose Bleed     Pt was here earlier this morning, pt states bleeding through the packing of left nostril  Pt states he used afrin at home and it has stopped the bleeding at this time  History provided by:  Medical records and patient  Nose Bleed  Location:  L nare  Severity:  Moderate  Timing:  Constant  Progression:  Resolved  Chronicity:  Recurrent  Context comment:  History of nosebleeds, posterior nosebleeds, requiring clipping, developed bleeding from the left nare early this morning, was seen in the emergency room, controlled by ENT, discharged home  At 1345, recurrence of bleeding, packed with afrin soaked tissue  Relieved by: afrin soaked tissue packing  Worsened by:  Nothing  Associated symptoms: no cough, no dizziness, no fever, no headaches and no sore throat    Risk factors: frequent nosebleeds        Prior to Admission Medications   Prescriptions Last Dose Informant Patient Reported? Taking?    Garlic 259 MG TABS   Yes No   Sig: Take 6,000 mg by mouth Three times a day   Patient not taking: Reported on 6/11/2022   Magnesium 250 MG TABS   No No   Sig: Take 1 tablet (250 mg total) by mouth daily   Patient not taking: Reported on 6/11/2022   SILDENAFIL CITRATE PO   Yes No   Sig: Take by mouth as needed   Patient not taking: Reported on 6/11/2022   aspirin (ECOTRIN LOW STRENGTH) 81 mg EC tablet   Yes No   Sig: Take 81 mg by mouth daily   cefuroxime (CEFTIN) 500 mg tablet   No No   Sig: Take 1 tablet (500 mg total) by mouth every 12 (twelve) hours for 5 days   lisinopril (ZESTRIL) 30 mg tablet  Self Yes No   Sig: Take 30 mg by mouth daily   metoprolol succinate (TOPROL-XL) 25 mg 24 hr tablet   No No   Sig: Take 1 tablet (25 mg total) by mouth daily   Patient not taking: Reported on 6/11/2022   metoprolol succinate (TOPROL-XL) 25 mg 24 hr tablet   Yes No   Sig: Take 25 mg by mouth daily   Patient not taking: Reported on 6/11/2022   multivitamin [facility]) TABS   Yes No   Sig: Take 1 tablet by mouth daily      Facility-Administered Medications: None       Past Medical History:   Diagnosis Date    BPH (benign prostatic hyperplasia)     COPD (chronic obstructive pulmonary disease) (HCC)     Hypertension     Osteoarthritis     Peripheral vascular disease (HCC)     stenting b/l iliac stenting 2019    Severe epistaxis        Past Surgical History:   Procedure Laterality Date    HERNIA REPAIR      ILIAC VEIN ANGIOPLASTY / STENTING Bilateral 2019    Dr Merlyn Wu at [address]   NOSE SURGERY      arterial clip due to epistaxis    SKIN CANCER EXCISION      VASECTOMY         Family History   Problem Relation Age of Onset    Lymphoma Mother     Colon cancer Father     Heart attack Brother     Heart attack Paternal Grandfather          61    Heart attack Brother     Substance Abuse Brother      I have reviewed and agree with the history as documented  E-Cigarette/Vaping    E-Cigarette Use Never User      E-Cigarette/Vaping Substances     Social History     Tobacco Use    Smoking status: Current Every Day Smoker     Packs/day: 1 00     Types: Cigarettes    Smokeless tobacco: Never Used   Vaping Use    Vaping Use: Never used   Substance Use Topics    Alcohol use: Yes     Comment: 5-6 beers a day    Drug use: Never       Review of Systems   Constitutional: Negative for appetite change, chills, fatigue and fever  HENT: Positive for nosebleeds  Negative for ear pain, rhinorrhea, sore throat and trouble swallowing  Eyes: Negative for pain, discharge and visual disturbance  Respiratory: Negative for cough, chest tightness and shortness of breath  Cardiovascular: Negative for chest pain and palpitations  Gastrointestinal: Negative for abdominal pain, nausea and vomiting  Genitourinary: Negative for difficulty urinating, dysuria, hematuria and testicular pain  Musculoskeletal: Negative for arthralgias and back pain     Skin: Negative for color change and rash  Neurological: Negative for dizziness, seizures, syncope, weakness and headaches  Hematological: Negative for adenopathy  Psychiatric/Behavioral: Negative for confusion and dysphoric mood  All other systems reviewed and are negative  Physical Exam  Physical Exam  Constitutional:       General: He is not in acute distress  Appearance: Normal appearance  He is not ill-appearing, toxic-appearing or diaphoretic  HENT:      Head: Normocephalic and atraumatic  Nose: No congestion or rhinorrhea  Comments: There is tissue packed into left nare, dried blood noted around packing, no active bleeding     Mouth/Throat:      Mouth: Mucous membranes are moist       Pharynx: Oropharynx is clear  No oropharyngeal exudate or posterior oropharyngeal erythema  Eyes:      General:         Right eye: No discharge  Left eye: No discharge  Cardiovascular:      Rate and Rhythm: Normal rate and regular rhythm  Pulses: Normal pulses  Heart sounds: Normal heart sounds  No murmur heard  No gallop  Pulmonary:      Effort: Pulmonary effort is normal  No respiratory distress  Breath sounds: Normal breath sounds  No stridor  No wheezing, rhonchi or rales  Chest:      Chest wall: No tenderness  Abdominal:      General: Bowel sounds are normal  There is no distension  Palpations: Abdomen is soft  There is no mass  Tenderness: There is no abdominal tenderness  There is no right CVA tenderness, left CVA tenderness, guarding or rebound  Hernia: No hernia is present  Musculoskeletal:         General: Normal range of motion  Cervical back: Normal range of motion and neck supple  Skin:     General: Skin is warm and dry  Capillary Refill: Capillary refill takes less than 2 seconds  Neurological:      General: No focal deficit present  Mental Status: He is alert and oriented to person, place, and time        Cranial Nerves: No cranial nerve deficit  Sensory: No sensory deficit  Motor: No weakness  Coordination: Coordination normal       Gait: Gait normal       Deep Tendon Reflexes: Reflexes normal    Psychiatric:         Mood and Affect: Mood normal          Behavior: Behavior normal          Thought Content:  Thought content normal          Judgment: Judgment normal          Vital Signs  ED Triage Vitals [06/11/22 1401]   Temperature Pulse Respirations Blood Pressure SpO2   99 3 °F (37 4 °C) 96 20 (!) 200/97 98 %      Temp Source Heart Rate Source Patient Position - Orthostatic VS BP Location FiO2 (%)   Temporal -- Sitting Right arm --      Pain Score       No Pain           Vitals:    06/11/22 1401 06/11/22 1430 06/11/22 1549   BP: (!) 200/97 (!) 181/79 164/76   Pulse: 96 88 80   Patient Position - Orthostatic VS: Sitting  Lying         Visual Acuity      ED Medications  Medications   bacitracin topical ointment 1 small application (has no administration in time range)   cloNIDine (CATAPRES) tablet 0 2 mg (0 2 mg Oral Given 6/11/22 1414)   cocaine 40 mg/mL topical solution ( Topical Given 6/11/22 1531)   silver nitrate-potassium nitrate (ARZOL SILVER NITRATE) 41-02 % applicator 1 applicator (1 applicator Topical Given 6/11/22 1531)       Diagnostic Studies  Results Reviewed     Procedure Component Value Units Date/Time    APTT [[id number]]  (Normal) Collected: 06/11/22 1427    Lab Status: Final result Specimen: Blood from Arm, Right Updated: 06/11/22 1445     PTT 31 seconds     Protime-INR [[id number]]  (Normal) Collected: 06/11/22 1427    Lab Status: Final result Specimen: Blood from Arm, Right Updated: 06/11/22 1445     Protime 12 5 seconds      INR 0 07    Basic metabolic panel [[id number]]  (Abnormal) Collected: 06/11/22 1411    Lab Status: Final result Specimen: Blood from Arm, Right Updated: 06/11/22 1431     Sodium 127 mmol/L      Potassium 4 2 mmol/L      Chloride 92 mmol/L      CO2 25 mmol/L      ANION GAP 10 mmol/L      BUN 11 mg/dL      Creatinine 1 23 mg/dL      Glucose 101 mg/dL      Calcium 8 6 mg/dL      eGFR 62 ml/min/1 73sq m     Narrative:      [place] guidelines for Chronic Kidney Disease (CKD):     Stage 1 with normal or high GFR (GFR > 90 mL/min/1 73 square meters)    Stage 2 Mild CKD (GFR = 60-89 mL/min/1 73 square meters)    Stage 3A Moderate CKD (GFR = 45-59 mL/min/1 73 square meters)    Stage 3B Moderate CKD (GFR = 30-44 mL/min/1 73 square meters)    Stage 4 Severe CKD (GFR = 15-29 mL/min/1 73 square meters)    Stage 5 End Stage CKD (GFR <15 mL/min/1 73 square meters)  Note: GFR calculation is accurate only with a steady state creatinine    CBC and differential [[id number]]  (Abnormal) Collected: 06/11/22 1411    Lab Status: Final result Specimen: Blood from Arm, Right Updated: 06/11/22 1415     WBC 5 27 Thousand/uL      RBC 4 02 Million/uL      Hemoglobin 13 7 g/dL      Hematocrit 37 6 %      MCV 94 fL      MCH 34 1 pg      MCHC 36 4 g/dL      RDW 13 6 %      MPV 8 6 fL      Platelets 635 Thousands/uL      nRBC 0 /100 WBCs      Neutrophils Relative 73 %      Immat GRANS % 1 %      Lymphocytes Relative 12 %      Monocytes Relative 12 %      Eosinophils Relative 2 %      Basophils Relative 0 %      Neutrophils Absolute 3 82 Thousands/µL      Immature Grans Absolute 0 03 Thousand/uL      Lymphocytes Absolute 0 65 Thousands/µL      Monocytes Absolute 0 64 Thousand/µL      Eosinophils Absolute 0 11 Thousand/µL      Basophils Absolute 0 02 Thousands/µL                  No orders to display              Procedures  Procedures         ED Course  ED Course as of 06/11/22 1612   Sat Jun 11, 2022   1357 1357:  Patient appears well, vital signs reviewed  Previous workup reviewed, patient had packing to the left nare this morning  Packing is still present  There is no active bleeding at this time  Placed on a monitor  Patient noted to have severe hypertension    Patient states he has been compliant with his lisinopril  Patient currently on aspirin, has not taken today  Patient is requesting that I speak with Dr Chantel Conrad his ENT specialist   I will consult Dr Chantel Conrad for assistance with care  I will check basic labs given repeat evaluation and potential need hospitalization  [id number]: Labs reviewed, findings of mod hyponatremia, review of previous labs 8/2019 show same sodium level  [id number] 45 [id number]:  Dr Chantel Conrad completed epistaxis care at bedside, see his note for full details, if patient remains without active bleeding plan to discharge home                                               MDM    Disposition  Final diagnoses:   Epistaxis   Primary hypertension     Time reflects when diagnosis was documented in both MDM as applicable and the Disposition within this note     Time User Action Codes Description Comment    6/11/2022  2:04 PM Karthikeyan Johnson Add [R04 0] Epistaxis     6/11/2022  2:05 PM Karthikeyan Johnson Add [I10] Primary hypertension       ED Disposition     ED Disposition   Discharge    Condition   Stable    Date/Time   Sat Jun 11, 2022  3:46 PM    Comment              Follow-up Information     Follow up With Specialties Details Why Nadia Cruz MD Otolaryngology Schedule an appointment as soon as possible for a visit   [address]  [phone]            Discharge Medication List as of 6/11/2022  3:47 PM      CONTINUE these medications which have NOT CHANGED    Details   aspirin (ECOTRIN LOW STRENGTH) 81 mg EC tablet Take 81 mg by mouth daily, Historical Med      cefuroxime (CEFTIN) 500 mg tablet Take 1 tablet (500 mg total) by mouth every 12 (twelve) hours for 5 days, Starting Sat 6/11/2022, Until u 6/16/2022, Normal      Garlic 737 MG TABS Take 6,000 mg by mouth Three times a day, Historical Med      lisinopril (ZESTRIL) 30 mg tablet Take 30 mg by mouth daily, Historical Med      Magnesium 250 MG TABS Take 1 tablet (250 mg total) by mouth daily, Starting Wed 11/18/2020, No Print      !! metoprolol succinate (TOPROL-XL) 25 mg 24 hr tablet Take 1 tablet (25 mg total) by mouth daily, Starting Fri 12/11/2020, Normal      !! metoprolol succinate (TOPROL-XL) 25 mg 24 hr tablet Take 25 mg by mouth daily, Historical Med      multivitamin (THERAGRAN) TABS Take 1 tablet by mouth daily, Historical Med      SILDENAFIL CITRATE PO Take by mouth as needed, Historical Med       !! - Potential duplicate medications found  Please discuss with provider  No discharge procedures on file      PDMP Review     None          ED Provider  Electronically Signed by           Ariadna Plunkett MD  06/11/22 0116

## 2022-06-11 NOTE — ED PROVIDER NOTES
History  Chief Complaint   Patient presents with    Nose Bleed     Patient states nosebleed started one hour ago  Denies blood thinners, trauma  Reports "extensive history" of nasal surgeries in the past       Pt  Is 60 yo male with hx of recurrent nb and prior nasal surgery and perforated nasal septum presents with epistaxis this am lasted 30 min stopped after he treated with affrin and packing with tissue at home  Wants ent called in to see him in ED due to concern that this will bleed again tomorrow  History provided by:  Patient      Prior to Admission Medications   Prescriptions Last Dose Informant Patient Reported? Taking?    Garlic 602 MG TABS Not Taking at Unknown time  Yes No   Sig: Take 6,000 mg by mouth Three times a day   Patient not taking: Reported on 6/11/2022   Magnesium 250 MG TABS Not Taking at Unknown time  No No   Sig: Take 1 tablet (250 mg total) by mouth daily   Patient not taking: Reported on 6/11/2022   SILDENAFIL CITRATE PO Not Taking at Unknown time  Yes No   Sig: Take by mouth as needed   Patient not taking: Reported on 6/11/2022   aspirin (ECOTRIN LOW STRENGTH) 81 mg EC tablet 6/11/2022 at Unknown time  Yes Yes   Sig: Take 81 mg by mouth daily   lisinopril (ZESTRIL) 30 mg tablet 6/11/2022 at Unknown time Self Yes Yes   Sig: Take 30 mg by mouth daily   metoprolol succinate (TOPROL-XL) 25 mg 24 hr tablet Not Taking at Unknown time  No No   Sig: Take 1 tablet (25 mg total) by mouth daily   Patient not taking: Reported on 6/11/2022   metoprolol succinate (TOPROL-XL) 25 mg 24 hr tablet Not Taking at Unknown time  Yes No   Sig: Take 25 mg by mouth daily   Patient not taking: Reported on 6/11/2022   multivitamin ([place]) TABS 6/10/2022 at Unknown time  Yes Yes   Sig: Take 1 tablet by mouth daily      Facility-Administered Medications: None       Past Medical History:   Diagnosis Date    BPH (benign prostatic hyperplasia)     [facility]     Hypertension     Osteoarthritis     Peripheral vascular disease ([facility] [address] [address] )     stenting b/l iliac stenting 2019    Severe epistaxis        Past Surgical History:   Procedure Laterality Date    HERNIA REPAIR      ILIAC VEIN ANGIOPLASTY / STENTING Bilateral 2019    [facility]   NOSE SURGERY      arterial clip due to epistaxis    SKIN CANCER EXCISION      VASECTOMY         Family History   Problem Relation Age of Onset    Lymphoma Mother     Colon cancer Father     Heart attack Brother     Heart attack Paternal Grandfather          61    Heart attack Brother     Substance Abuse Brother      I have reviewed and agree with the history as documented  E-Cigarette/Vaping    E-Cigarette Use Never User      E-Cigarette/Vaping Substances     Social History     Tobacco Use    Smoking status: Current Every Day Smoker     Packs/day: 1 00     Types: Cigarettes    Smokeless tobacco: Never Used   Vaping Use    Vaping Use: Never used   Substance Use Topics    Alcohol use: Yes     Comment: 5-6 beers a day    Drug use: Never       Review of Systems   Constitutional: Negative for activity change, appetite change, chills, fatigue and fever  HENT: Positive for nosebleeds  Negative for congestion, ear pain, rhinorrhea and sore throat  Eyes: Negative for discharge, redness and visual disturbance  Respiratory: Negative for cough, chest tightness, shortness of breath and wheezing  Cardiovascular: Negative for chest pain and palpitations  Gastrointestinal: Negative for abdominal pain, constipation, diarrhea, nausea and vomiting  Endocrine: Negative for polydipsia and polyuria  Genitourinary: Negative for difficulty urinating, dysuria, frequency, hematuria and urgency  Musculoskeletal: Negative for arthralgias and myalgias  Skin: Negative for color change, pallor and rash  Neurological: Negative for dizziness, weakness, light-headedness, numbness and headaches     Hematological: Negative for adenopathy  Does not bruise/bleed easily  All other systems reviewed and are negative  Physical Exam  Physical Exam  Vitals and nursing note reviewed  Constitutional:       Appearance: He is well-developed  HENT:      Head: Normocephalic and atraumatic  Right Ear: External ear normal       Left Ear: External ear normal       Nose:      Right Nostril: Epistaxis present  Left Nostril: Epistaxis present  Eyes:      Conjunctiva/sclera: Conjunctivae normal       Pupils: Pupils are equal, round, and reactive to light  Cardiovascular:      Rate and Rhythm: Normal rate and regular rhythm  Heart sounds: Normal heart sounds  Pulmonary:      Effort: Pulmonary effort is normal  No respiratory distress  Breath sounds: Normal breath sounds  No wheezing or rales  Chest:      Chest wall: No tenderness  Abdominal:      General: Bowel sounds are normal  There is no distension  Palpations: Abdomen is soft  Tenderness: There is no abdominal tenderness  There is no guarding  Musculoskeletal:         General: Normal range of motion  Cervical back: Normal range of motion and neck supple  Skin:     General: Skin is warm and dry  Neurological:      Mental Status: He is alert and oriented to person, place, and time  Cranial Nerves: No cranial nerve deficit  Sensory: No sensory deficit           Vital Signs  ED Triage Vitals [06/11/22 0557]   Temperature Pulse Respirations Blood Pressure SpO2   98 8 °F (37 1 °C) 92 20 (!) 201/103 99 %      Temp Source Heart Rate Source Patient Position - Orthostatic VS BP Location FiO2 (%)   Temporal Monitor Sitting Right arm --      Pain Score       No Pain           Vitals:    06/11/22 0557 06/11/22 0600 06/11/22 0730   BP: (!) 201/103 (!) 191/102 (!) 177/98   Pulse: 92 95 92   Patient Position - Orthostatic VS: Sitting Sitting          Visual Acuity      ED Medications  Medications   cocaine 40 mg/mL topical solution ( Topical Given 6/11/22 0657)   silver nitrate-potassium nitrate (ARZOL SILVER NITRATE) 96-45 % applicator 1 applicator (1 applicator Topical Given 6/11/22 0701)       Diagnostic Studies  Results Reviewed     None                 No orders to display              Procedures  Procedures         ED Course  ED Course as of 06/11/22 1347   Sat Jun 11, 2022   0609 Spoke with Dr Adeline Brown will come to the ED to see and evaluate and treat patient  7065 Case discussed and care transferred to Dr Elton Willis pending ENT evaluation and treatment in the ED and disposition  SBIRT 22yo+    Flowsheet Row Most Recent Value   SBIRT (23 yo +)    In order to provide better care to our patients, we are screening all of our patients for alcohol and drug use  Would it be okay to ask you these screening questions? No Filed at: 06/11/2022 0600                    MDM  Number of Diagnoses or Management Options      Disposition  Final diagnoses:   Epistaxis     Time reflects when diagnosis was documented in both MDM as applicable and the Disposition within this note     Time User Action Codes Description Comment    6/11/2022  6:23 AM Dania Hernadez Add [R04 0] Epistaxis       ED Disposition     ED Disposition   Discharge    Condition   Stable    Date/Time   Sat Jun 11, 2022  6:23 AM    Comment   Suellen Szymanski discharge to home/self care                 Follow-up Information     Follow up With Specialties Details Why [address] MD Otolaryngology   [facility]  [facility]  [phone]            Discharge Medication List as of 6/11/2022  7:25 AM      START taking these medications    Details   cefuroxime (CEFTIN) 500 mg tablet Take 1 tablet (500 mg total) by mouth every 12 (twelve) hours for 5 days, Starting Sat 6/11/2022, Until Thu 6/16/2022, Normal         CONTINUE these medications which have NOT CHANGED    Details   aspirin ([address]) 81 mg EC tablet Take 81 mg by mouth daily, Historical Med      lisinopril (ZESTRIL) 30 mg tablet Take 30 mg by mouth daily, Historical Med      multivitamin (THERAGRAN) TABS Take 1 tablet by mouth daily, Historical Med      Garlic 505 MG TABS Take 6,000 mg by mouth Three times a day, Historical Med      Magnesium 250 MG TABS Take 1 tablet (250 mg total) by mouth daily, Starting Wed 11/18/2020, No Print      !! metoprolol succinate (TOPROL-XL) 25 mg 24 hr tablet Take 1 tablet (25 mg total) by mouth daily, Starting Fri 12/11/2020, Normal      !! metoprolol succinate (TOPROL-XL) 25 mg 24 hr tablet Take 25 mg by mouth daily, Historical Med      SILDENAFIL CITRATE PO Take by mouth as needed, Historical Med       !! - Potential duplicate medications found  Please discuss with provider  No discharge procedures on file      PDMP Review     None          ED Provider  Electronically Signed by           Devin Lozano DO  06/11/22 7650

## 2022-06-12 ENCOUNTER — ANESTHESIA EVENT (OUTPATIENT)
Dept: PERIOP | Facility: HOSPITAL | Age: 62
End: 2022-06-12
Payer: COMMERCIAL

## 2022-06-12 ENCOUNTER — APPOINTMENT (OUTPATIENT)
Dept: RADIOLOGY | Facility: HOSPITAL | Age: 62
End: 2022-06-12
Payer: COMMERCIAL

## 2022-06-12 ENCOUNTER — ANESTHESIA (OUTPATIENT)
Dept: PERIOP | Facility: HOSPITAL | Age: 62
End: 2022-06-12
Payer: COMMERCIAL

## 2022-06-12 VITALS
HEART RATE: 84 BPM | BODY MASS INDEX: 17.63 KG/M2 | TEMPERATURE: 97.3 F | RESPIRATION RATE: 16 BRPM | OXYGEN SATURATION: 92 % | DIASTOLIC BLOOD PRESSURE: 80 MMHG | SYSTOLIC BLOOD PRESSURE: 146 MMHG | HEIGHT: 72 IN

## 2022-06-12 PROBLEM — Z72.0 TOBACCO ABUSE: Status: ACTIVE | Noted: 2022-06-12

## 2022-06-12 PROBLEM — I47.1 ATRIAL TACHYCARDIA (HCC): Status: ACTIVE | Noted: 2022-06-12

## 2022-06-12 PROBLEM — E87.1 HYPONATREMIA: Status: ACTIVE | Noted: 2022-06-12

## 2022-06-12 PROBLEM — E44.0 MODERATE PROTEIN-CALORIE MALNUTRITION (HCC): Status: ACTIVE | Noted: 2022-06-12

## 2022-06-12 PROBLEM — I47.19 ATRIAL TACHYCARDIA: Status: ACTIVE | Noted: 2022-06-12

## 2022-06-12 LAB
ANION GAP SERPL CALCULATED.3IONS-SCNC: 9 MMOL/L (ref 4–13)
BACTERIA UR QL AUTO: ABNORMAL /HPF
BILIRUB UR QL STRIP: NEGATIVE
BUN SERPL-MCNC: 8 MG/DL (ref 5–25)
CALCIUM SERPL-MCNC: 8 MG/DL (ref 8.3–10.1)
CHLORIDE SERPL-SCNC: 91 MMOL/L (ref 100–108)
CLARITY UR: CLEAR
CO2 SERPL-SCNC: 24 MMOL/L (ref 21–32)
COLOR UR: YELLOW
CREAT SERPL-MCNC: 0.72 MG/DL (ref 0.6–1.3)
ERYTHROCYTE [DISTWIDTH] IN BLOOD BY AUTOMATED COUNT: 13.4 % (ref 11.6–15.1)
GFR SERPL CREATININE-BSD FRML MDRD: 99 ML/MIN/1.73SQ M
GLUCOSE P FAST SERPL-MCNC: 129 MG/DL (ref 65–99)
GLUCOSE SERPL-MCNC: 129 MG/DL (ref 65–140)
GLUCOSE UR STRIP-MCNC: NEGATIVE MG/DL
HCT VFR BLD AUTO: 34.4 % (ref 36.5–49.3)
HGB BLD-MCNC: 12.3 G/DL (ref 12–17)
HGB UR QL STRIP.AUTO: NEGATIVE
KETONES UR STRIP-MCNC: NEGATIVE MG/DL
LEUKOCYTE ESTERASE UR QL STRIP: NEGATIVE
MCH RBC QN AUTO: 33.5 PG (ref 26.8–34.3)
MCHC RBC AUTO-ENTMCNC: 35.8 G/DL (ref 31.4–37.4)
MCV RBC AUTO: 94 FL (ref 82–98)
NITRITE UR QL STRIP: NEGATIVE
NON-SQ EPI CELLS URNS QL MICRO: ABNORMAL /HPF
OSMOLALITY UR: 336 MMOL/KG
PH UR STRIP.AUTO: 7 [PH]
PLATELET # BLD AUTO: 251 THOUSANDS/UL (ref 149–390)
PMV BLD AUTO: 8.4 FL (ref 8.9–12.7)
POTASSIUM SERPL-SCNC: 4.1 MMOL/L (ref 3.5–5.3)
PROT UR STRIP-MCNC: NEGATIVE MG/DL
RBC # BLD AUTO: 3.67 MILLION/UL (ref 3.88–5.62)
RBC #/AREA URNS AUTO: ABNORMAL /HPF
SODIUM 24H UR-SCNC: 69 MOL/L
SODIUM SERPL-SCNC: 124 MMOL/L (ref 136–145)
SP GR UR STRIP.AUTO: 1.01 (ref 1–1.03)
TSH SERPL DL<=0.05 MIU/L-ACNC: 2.77 UIU/ML (ref 0.45–4.5)
UROBILINOGEN UR QL STRIP.AUTO: 0.2 E.U./DL
WBC # BLD AUTO: 4.9 THOUSAND/UL (ref 4.31–10.16)
WBC #/AREA URNS AUTO: ABNORMAL /HPF

## 2022-06-12 PROCEDURE — 93005 ELECTROCARDIOGRAM TRACING: CPT

## 2022-06-12 PROCEDURE — 81001 URINALYSIS AUTO W/SCOPE: CPT | Performed by: NURSE PRACTITIONER

## 2022-06-12 PROCEDURE — 84300 ASSAY OF URINE SODIUM: CPT | Performed by: NURSE PRACTITIONER

## 2022-06-12 PROCEDURE — 99225 PR SBSQ OBSERVATION CARE/DAY 25 MINUTES: CPT | Performed by: INTERNAL MEDICINE

## 2022-06-12 PROCEDURE — 83935 ASSAY OF URINE OSMOLALITY: CPT | Performed by: NURSE PRACTITIONER

## 2022-06-12 PROCEDURE — 85027 COMPLETE CBC AUTOMATED: CPT | Performed by: OTOLARYNGOLOGY

## 2022-06-12 PROCEDURE — 71045 X-RAY EXAM CHEST 1 VIEW: CPT

## 2022-06-12 PROCEDURE — 80048 BASIC METABOLIC PNL TOTAL CA: CPT | Performed by: NURSE PRACTITIONER

## 2022-06-12 PROCEDURE — 84443 ASSAY THYROID STIM HORMONE: CPT | Performed by: NURSE PRACTITIONER

## 2022-06-12 RX ORDER — EPHEDRINE SULFATE 50 MG/ML
INJECTION INTRAVENOUS AS NEEDED
Status: DISCONTINUED | OUTPATIENT
Start: 2022-06-12 | End: 2022-06-12

## 2022-06-12 RX ORDER — OXYMETAZOLINE HYDROCHLORIDE 0.05 G/100ML
2 SPRAY NASAL AS NEEDED
Status: DISCONTINUED | OUTPATIENT
Start: 2022-06-12 | End: 2022-06-12 | Stop reason: HOSPADM

## 2022-06-12 RX ORDER — SODIUM CHLORIDE 9 MG/ML
75 INJECTION, SOLUTION INTRAVENOUS CONTINUOUS
Status: DISCONTINUED | OUTPATIENT
Start: 2022-06-12 | End: 2022-06-12

## 2022-06-12 RX ORDER — ONDANSETRON 2 MG/ML
4 INJECTION INTRAMUSCULAR; INTRAVENOUS ONCE AS NEEDED
Status: DISCONTINUED | OUTPATIENT
Start: 2022-06-12 | End: 2022-06-12

## 2022-06-12 RX ORDER — MIDAZOLAM HYDROCHLORIDE 2 MG/2ML
INJECTION, SOLUTION INTRAMUSCULAR; INTRAVENOUS AS NEEDED
Status: DISCONTINUED | OUTPATIENT
Start: 2022-06-12 | End: 2022-06-12

## 2022-06-12 RX ORDER — COCAINE HYDROCHLORIDE 40 MG/ML
SOLUTION NASAL AS NEEDED
Status: DISCONTINUED | OUTPATIENT
Start: 2022-06-12 | End: 2022-06-12 | Stop reason: HOSPADM

## 2022-06-12 RX ORDER — LIDOCAINE HYDROCHLORIDE 10 MG/ML
INJECTION, SOLUTION EPIDURAL; INFILTRATION; INTRACAUDAL; PERINEURAL AS NEEDED
Status: DISCONTINUED | OUTPATIENT
Start: 2022-06-12 | End: 2022-06-12

## 2022-06-12 RX ORDER — PHENYLEPHRINE HYDROCHLORIDE 10 MG/ML
INJECTION INTRAVENOUS AS NEEDED
Status: DISCONTINUED | OUTPATIENT
Start: 2022-06-12 | End: 2022-06-12

## 2022-06-12 RX ORDER — FENTANYL CITRATE 50 UG/ML
INJECTION, SOLUTION INTRAMUSCULAR; INTRAVENOUS AS NEEDED
Status: DISCONTINUED | OUTPATIENT
Start: 2022-06-12 | End: 2022-06-12

## 2022-06-12 RX ORDER — SUCCINYLCHOLINE/SOD CL,ISO/PF 100 MG/5ML
SYRINGE (ML) INTRAVENOUS AS NEEDED
Status: DISCONTINUED | OUTPATIENT
Start: 2022-06-12 | End: 2022-06-12

## 2022-06-12 RX ORDER — SODIUM CHLORIDE, SODIUM LACTATE, POTASSIUM CHLORIDE, CALCIUM CHLORIDE 600; 310; 30; 20 MG/100ML; MG/100ML; MG/100ML; MG/100ML
INJECTION, SOLUTION INTRAVENOUS CONTINUOUS PRN
Status: DISCONTINUED | OUTPATIENT
Start: 2022-06-12 | End: 2022-06-12

## 2022-06-12 RX ORDER — MORPHINE SULFATE 4 MG/ML
4 INJECTION, SOLUTION INTRAMUSCULAR; INTRAVENOUS
Status: DISCONTINUED | OUTPATIENT
Start: 2022-06-12 | End: 2022-06-12 | Stop reason: HOSPADM

## 2022-06-12 RX ORDER — DEXAMETHASONE SODIUM PHOSPHATE 10 MG/ML
INJECTION, SOLUTION INTRAMUSCULAR; INTRAVENOUS AS NEEDED
Status: DISCONTINUED | OUTPATIENT
Start: 2022-06-12 | End: 2022-06-12

## 2022-06-12 RX ORDER — LIDOCAINE HYDROCHLORIDE AND EPINEPHRINE 10; 10 MG/ML; UG/ML
INJECTION, SOLUTION INFILTRATION; PERINEURAL AS NEEDED
Status: DISCONTINUED | OUTPATIENT
Start: 2022-06-12 | End: 2022-06-12 | Stop reason: HOSPADM

## 2022-06-12 RX ORDER — OXYMETAZOLINE HYDROCHLORIDE 0.05 G/100ML
SPRAY NASAL AS NEEDED
Status: DISCONTINUED | OUTPATIENT
Start: 2022-06-12 | End: 2022-06-12 | Stop reason: HOSPADM

## 2022-06-12 RX ORDER — FENTANYL CITRATE/PF 50 MCG/ML
50 SYRINGE (ML) INJECTION
Status: DISCONTINUED | OUTPATIENT
Start: 2022-06-12 | End: 2022-06-12

## 2022-06-12 RX ORDER — PROPOFOL 10 MG/ML
INJECTION, EMULSION INTRAVENOUS AS NEEDED
Status: DISCONTINUED | OUTPATIENT
Start: 2022-06-12 | End: 2022-06-12

## 2022-06-12 RX ORDER — MORPHINE SULFATE 4 MG/ML
4 INJECTION, SOLUTION INTRAMUSCULAR; INTRAVENOUS ONCE
Status: COMPLETED | OUTPATIENT
Start: 2022-06-12 | End: 2022-06-12

## 2022-06-12 RX ORDER — ONDANSETRON 2 MG/ML
4 INJECTION INTRAMUSCULAR; INTRAVENOUS EVERY 6 HOURS PRN
Status: DISCONTINUED | OUTPATIENT
Start: 2022-06-12 | End: 2022-06-12 | Stop reason: HOSPADM

## 2022-06-12 RX ORDER — SODIUM CHLORIDE/ALOE VERA
1 GEL (GRAM) NASAL 4 TIMES DAILY
Status: DISCONTINUED | OUTPATIENT
Start: 2022-06-12 | End: 2022-06-12 | Stop reason: HOSPADM

## 2022-06-12 RX ADMIN — PHENYLEPHRINE HYDROCHLORIDE 200 MCG: 10 INJECTION INTRAVENOUS at 09:16

## 2022-06-12 RX ADMIN — Medication 100 MG: at 09:11

## 2022-06-12 RX ADMIN — LIDOCAINE HYDROCHLORIDE 50 MG: 10 INJECTION, SOLUTION EPIDURAL; INFILTRATION; INTRACAUDAL; PERINEURAL at 09:11

## 2022-06-12 RX ADMIN — PHENYLEPHRINE HYDROCHLORIDE 200 MCG: 10 INJECTION INTRAVENOUS at 09:30

## 2022-06-12 RX ADMIN — ONDANSETRON 4 MG: 2 INJECTION INTRAMUSCULAR; INTRAVENOUS at 09:27

## 2022-06-12 RX ADMIN — ONDANSETRON 4 MG: 2 INJECTION INTRAMUSCULAR; INTRAVENOUS at 09:01

## 2022-06-12 RX ADMIN — OXYMETAZOLINE HYDROCHLORIDE 2 SPRAY: 5 SPRAY NASAL at 04:10

## 2022-06-12 RX ADMIN — DEXAMETHASONE SODIUM PHOSPHATE 10 MG: 10 INJECTION INTRAMUSCULAR; INTRAVENOUS at 09:01

## 2022-06-12 RX ADMIN — Medication 1 APPLICATION: at 17:04

## 2022-06-12 RX ADMIN — LISINOPRIL 30 MG: 10 TABLET ORAL at 10:57

## 2022-06-12 RX ADMIN — MIDAZOLAM 2 MG: 1 INJECTION INTRAMUSCULAR; INTRAVENOUS at 09:01

## 2022-06-12 RX ADMIN — EPHEDRINE SULFATE 5 MG: 50 INJECTION, SOLUTION INTRAVENOUS at 09:31

## 2022-06-12 RX ADMIN — CEFUROXIME AXETIL 500 MG: 250 TABLET ORAL at 10:58

## 2022-06-12 RX ADMIN — EPHEDRINE SULFATE 10 MG: 50 INJECTION, SOLUTION INTRAVENOUS at 09:14

## 2022-06-12 RX ADMIN — FENTANYL CITRATE 50 MCG: 50 INJECTION INTRAMUSCULAR; INTRAVENOUS at 09:24

## 2022-06-12 RX ADMIN — SODIUM CHLORIDE, SODIUM LACTATE, POTASSIUM CHLORIDE, AND CALCIUM CHLORIDE: .6; .31; .03; .02 INJECTION, SOLUTION INTRAVENOUS at 09:51

## 2022-06-12 RX ADMIN — FENTANYL CITRATE 50 MCG: 50 INJECTION INTRAMUSCULAR; INTRAVENOUS at 09:35

## 2022-06-12 RX ADMIN — PROPOFOL 100 MG: 10 INJECTION, EMULSION INTRAVENOUS at 09:11

## 2022-06-12 RX ADMIN — MORPHINE SULFATE 4 MG: 4 INJECTION INTRAVENOUS at 07:24

## 2022-06-12 RX ADMIN — SODIUM CHLORIDE, SODIUM LACTATE, POTASSIUM CHLORIDE, AND CALCIUM CHLORIDE: .6; .31; .03; .02 INJECTION, SOLUTION INTRAVENOUS at 09:01

## 2022-06-12 RX ADMIN — MORPHINE SULFATE 4 MG: 4 INJECTION INTRAVENOUS at 04:23

## 2022-06-12 RX ADMIN — EPHEDRINE SULFATE 5 MG: 50 INJECTION, SOLUTION INTRAVENOUS at 09:16

## 2022-06-12 RX ADMIN — Medication 1 APPLICATION: at 12:34

## 2022-06-12 NOTE — INTERIM OP NOTE
CAUTERIZATION NASAL /SEPTAL  Postoperative Note  PATIENT NAME: Jael Cristina  : 1960  MRN: [id number]  OW OR ROOM 02    Surgery Date: 2022    Preop Diagnosis:  Epistaxis [R04 0]    Post-Op Diagnosis Codes:     * Epistaxis [R04 0]    Procedure(s) (LRB):  CAUTERIZATION NASAL /SEPTAL (Bilateral)    Surgeon(s) and Role: Antionette Arroyo MD - Primary    Specimens:  * No specimens in log *    Estimated Blood Loss:   Minimal    Anesthesia Type:   General     Findings:    Diffuse bleeding sites around the nasal septal perforation and along the left middle turbinate and anterior septal area superiorly  All of which were cauterized    Complications:   None    SIGNATURE: Remedios Rodriguez MD   DATE: 2022   TIME: 10:07 AM

## 2022-06-12 NOTE — PROGRESS NOTES
Progress Note - Conchetta Cheadle 59 y o  male MRN: [id number]    Unit/Bed#: -01 Encounter: [id number]      Assessment:  Persistent bilateral epistaxis complicated by aspirin use with further packing placed this morning  Hemoglobin has dropped from 13 7-12  2  Plan: We are going to plan to take the patient to the operating room for the nasal endoscopy with bilateral nasal cautery  We will possibly place packing depending on what is found at the time of the operation  2  Consent has been obtained  Patient understands the complications of surgery which include bleeding, infection, recurrent epistaxis, failure to achieve desired results, and agrees to the procedure  Subjective:   Patient noted onset of bleeding now from the right nostril  He has a known septal perforation  His left nasal cavity is packed  Objective:     Vitals: Blood pressure 147/82, pulse 80, temperature 97 6 °F (36 4 °C), resp  rate 17, height 6' (1 829 m), SpO2 96 %  ,Body mass index is 17 63 kg/m²  No intake or output data in the 24 hours ending 06/12/22 0457    Physical Exam:  The patient was seen and noted to have bleeding from the right nostril  The Merocel sponge in the left nostril is saturated with some blood  There is blood in the oropharynx  A 10 cm rapid rhino was soaked in sterile water for 30 seconds and then placed into the right nasal cavity and inflated with approximately 15 mL of air  This controlled the right-sided epistaxis  There has been no bleeding from the left nasal cavity with the packing in place  There was no blood noted in the oropharynx  Invasive Devices  Report    Peripheral Intravenous Line  Duration           Peripheral IV 06/11/22 Right Antecubital <1 day                Lab, Imaging and other studies: I have personally reviewed pertinent reports      VTE Pharmacologic Prophylaxis: Reason for no pharmacologic prophylaxis Patient is mobile and bleeding  VTE Mechanical Prophylaxis: reason for no mechanical VTE prophylaxis Patient is mobile

## 2022-06-12 NOTE — PROGRESS NOTES
[address]  Progress Note - Tavia Blanton 1960, 59 y o  male MRN: [id number]  Unit/Bed#: -01 Encounter: [id number]  Primary Care Provider: Krystal Martinez MD   Date and time admitted to hospital: 6/11/2022  6:22 PM    * Epistaxis  Assessment & Plan  68-year-old gentleman with PA D status post stenting, tobacco use, COPD, and hypertension presented to the hospital with epistaxis  · Status post nasal cauterization today  Epistaxis has resolved  · Hemoglobin stable  · Discharge:  Medically stable for discharge at any time  No changes have been made to medication regimen    Atrial tachycardia [facility])  Assessment & Plan  · Previously saw cardiology with recommendation for metoprolol  Patient no longer taking    Chronic hyponatremia  Assessment & Plan  · Chronic hyponatremia would recommend outpatient follow-up with nephrology  · May be in the setting of underlying COPD    Results from last 7 days   Lab Units 06/12/22  0434 06/11/22  1411   SODIUM mmol/L 124* 127*       Tobacco abuse  Assessment & Plan  · Advised smoking cessation    History of COPD  Assessment & Plan  · Continue Advair as previously taken  Peripheral vascular disease ([facility] [address] )  Assessment & Plan  · PAD with history of stenting  Holding aspirin given epistaxis    Uncontrolled hypertension  Assessment & Plan  · Improved since admission  Continue lisinopril      VTE Pharmacologic Prophylaxis: VTE Score: 2 Low Risk (Score 0-2) - Encourage Ambulation  Patient Centered Rounds: I have performed bedside rounds with nursing staff today  Discussions with Specialists or Other Care Team Provider:     Education and Discussions with Family / Patient:  Sponsor bedside    Time Spent for Care: 20 mins  More than 50% of total time spent on counseling and coordination of care as described above      Current Length of Stay: 0 day(s)  Current Patient Status: Observation     Discharge Plan / Estimated Discharge Date: SLIM is following this patient on consult  They are medically stable for discharge when deemed appropriate by primary service  Code Status: No Order      Subjective:   Patient seen and examined  No new complaints, returns from OR  Objective:   Vitals: Blood pressure 154/80, pulse 85, temperature 98 °F (36 7 °C), resp  rate 18, height 6' (1 829 m), SpO2 94 %  Physical Exam  Vitals reviewed  Constitutional:       General: He is not in acute distress  HENT:      Head: Atraumatic  Cardiovascular:      Rate and Rhythm: Regular rhythm  Pulmonary:      Effort: Pulmonary effort is normal       Breath sounds: Decreased breath sounds present  No wheezing  Abdominal:      General: Bowel sounds are normal       Palpations: Abdomen is soft  Tenderness: There is no abdominal tenderness  There is no rebound  Musculoskeletal:         General: No swelling or tenderness  Skin:     General: Skin is warm and dry  Neurological:      Mental Status: He is alert  Cranial Nerves: No cranial nerve deficit  Psychiatric:         Mood and Affect: Mood normal        Additional Data:   Labs:  Results from last 7 days   Lab Units 06/12/22  0434 06/11/22  1427 06/11/22  1411   WBC Thousand/uL 4 90  --  5 27   HEMOGLOBIN g/dL 12 3  --  13 7   HEMATOCRIT % 34 4*  --  37 6   MCV fL 94  --  94   PLATELETS Thousands/uL 251  --  307   INR   --  0 94  --      Results from last 7 days   Lab Units 06/12/22  0434 06/11/22  1411   SODIUM mmol/L 124* 127*   POTASSIUM mmol/L 4 1 4 2   CHLORIDE mmol/L 91* 92*   CO2 mmol/L 24 25   ANION GAP mmol/L 9 10   BUN mg/dL 8 11   CREATININE mg/dL 0 72 1 23   CALCIUM mg/dL 8 0* 8 6   EGFR ml/min/1 73sq m 99 62   GLUCOSE RANDOM mg/dL 129 101                                  Results from last 7 days   Lab Units 06/12/22  0434   TSH 3RD GENERATON uIU/mL 2 765     * I Have Reviewed All Lab Data Listed Above      Cultures:                   Lines/Drains:  Invasive Devices  Report    Peripheral Intravenous Line  Duration           Peripheral IV 06/11/22 Right Antecubital <1 day              Telemetry:      Imaging:  Imaging Reports Reviewed Today Include:   No results found  Scheduled Meds:  Current Facility-Administered Medications   Medication Dose Route Frequency Provider Last Rate    acetaminophen  650 mg Oral Q6H PRN Thor Kumar MD      cefuroxime  500 mg Oral Q12H [address] Thor Kumar MD      HYDROcodone-acetaminophen  1 tablet Oral Q4H PRN Thor Kumar MD      [facility] Hold] labetalol  10 mg Intravenous Q6H PRN EDDIE Godinez      lisinopril  30 mg Oral Daily Thor Kumar MD      [facility] Hold] morphine injection  4 mg Intravenous Q3H PRN Thor Kumar MD      ondansetron  4 mg Intravenous Q6H PRN Thor Kumar MD      [facility] Hold] oxymetazoline  2 spray Each Nare PRN Thor Kumar MD      sodium chloride  1 application Nasal 4x Daily Thor Kumar MD      sodium chloride  75 mL/hr Intravenous Continuous Thor Kumar MD         Today, Patient Was Seen By: Melida Shi DO    ** Please Note: Dictation voice to text software may have been used in the creation of this document   **

## 2022-06-12 NOTE — H&P
History:  The patient is a 76-year-old male who has been in the emergency room now 3 times with left-sided epistaxis  He has longstanding history of left-sided epistaxis which actually required surgery at [facility] with sphenopalatine artery ligation 5 years ago  He occasionally sees us in the office for recurrent epistaxis  Today it started early this morning and he was initially treated with some cautery and superior nasal cavity packing  The blood for the packing presented back to the emergency room were 2 packs were placed and he bled through that packing on the left side  He then a presented early this evening were 10 cm Merocel sponge was placed in left nasal cavity after silver nitrate cautery was performed on bleeding sites most notable at the anterior superior portion of the large left-sided nasal septal perforation which he has as result of the previous nasal and sinus surgery  He had controlled the epistaxis with this last cautery and packing  His hemoglobin earlier today was 13 7  We are going to admit him overnight for observation with the packing in place since he has had recurrent epistaxis and his history is quite complicated  He does use aspirin 81 mg daily and is a chronic smoker  Was also noted to have some elevation of his blood pressure in the emergency room  It did require clonidine earlier today  Allergies:  None    Meds:  Lisinopril 30 mg once daily  ASA  Past medical history:  Notable for epistaxis and previous sinus and nasal surgery as noted above  Hypertension  Peripheral vascular disease  COPD   BPH  Family history:  Coronary artery disease otherwise noncontributory    Social history:  Heavy smoker    Alcohol denied    Review of systems:  As above otherwise noncontributory    Physical exam:  Reveals a thin chronically ill individual in no apparent distress  Head, normocephalic/atraumatic  Neck:  With no palpable adenopathy or thyromegaly  Eyes:  Pupils equal round react to light extraocular moves were intact  Ears:  TMs and canals are clear  Nose:  Bleeding site noted in the left superior anterior septal area at the area of the anterior superior portion of the large septal perforation  It was cauterized with silver nitrate  There also bleeding sites along the posterior portion left side of the nasal septum in the area the posterior and inferior portion of the septal perforation  They were cauterized  And a 10 cm Merocel sponge was placed  There is a large septal perforation  Turbinates are normal in size  Septum is otherwise a midline position  Oral cavity/oropharynx:  No bleeding noted otherwise normal  Nasopharynx/larynx:  Not visualized  Chest:  Distant breath sounds otherwise clear  Heart:  Normal rate rhythm without murmur  Abdomen: Bowel sounds active benign   and rectal exams:  Deferred  Neuro exam nonfocal    Assessment:  1  Left-sided epistaxis with posterior packing and cautery  2  Hypertension    Plan: We will admit him is a 23 hour observation since he has had 3 episodes of epistaxis today  We will continue Ceftin 250 mg twice daily  We will treat his pain with Tylenol and Vicodin  We will consult Medicine since he did have some elevation was blood pressure in the emergency room  He is on lisinopril 30 mg daily  We will keep him NPO post midnight just in case he needs to go to the operating room tomorrow  If he does well overnight we may but will discharge him tomorrow with packing in place with removal in the office

## 2022-06-12 NOTE — OP NOTE
OPERATIVE REPORT  PATIENT NAME: Concepcion Ryan    :  1960  MRN: [id number]  Pt Location: [facility] OR ROOM 02    SURGERY DATE: 2022    Surgeon(s) and Role: Conception MD Ayleen - Primary    Preop Diagnosis:  Epistaxis [R04 0]    Post-Op Diagnosis Codes:     * Epistaxis [R04 0]    Procedure(s) (LRB):  CAUTERIZATION NASAL /SEPTAL (Bilateral)    Specimen(s):  * No specimens in log *    Estimated Blood Loss:   Minimal    Drains:  * No LDAs found *    Anesthesia Type:   General    Operative Indications:  Epistaxis [R04 0]  Recurrent epistaxis despite nasal packing    Operative Findings:  Diffuse bleeding sites involving the anterior septum and around the large septal perforation as well as the left middle turbinate  Right-sided epistaxis was also inferiorly anteriorly around the nasal septal perforation and along the right middle turbinate    Complications:   None    Procedure and Technique:  The patient was taken the operating room and underwent oral endotracheal intubation and general anesthesia  A time-out was called  Then, the rapid rhino packing in the right nasal cavity was deflated and removed and then the 10 cm Merocel sponge in the left nasal cavity was extracted with a bayonet forceps  Next the nasal  cavity was treated with topical Afrin nasal sprays Ford in both nasal cavities and then 1% lidocaine with epinephrine was injected into each nasal septal areas and to the areas of the middle turbinate bilaterally  4% cocaine was then placed into each nasal cavity on pledgets  He was then prepped and draped in usual fashion  Next, the 0 degree endoscope was then placed into the left nasal cavity  There were multiple bleeding sites the 1st was in the anterior superior portion of the left side of the septum  With the suction Bovie cautery at 20 w coag this area was cauterized    Then there were bleeding sites along the nasal septal perforation posteriorly and superiorly these were also cauterized with suction Bovie cautery and then there was some bleeding sites along the middle turbinate which were also cauterized with suction Bovie cautery  All the blood from the left nasal cavity was suctioned and there was some further bleeding sites cauterized around the floor in the inferior portion of the remaining left inferior portion of the nasal septum below the perforation itself  Then, attention was then turned to the right nasal cavity were bleeding sites along the right side of the nasal septal perforation along the septum itself both posteriorly and superiorly were cauterized with suction Bovie cautery they were also bleeding sites anteriorly along the septum which were cauterized as well as the right middle turbinate  These areas were checked and rechecked and no further bleeding noted  Surgiflo was then applied into each nasal cavity and with the use of a caudal knife was spread over the areas of cauterization  The procedure was then terminated and the patient was taken to the recovery room in stable condition upon awakening     I was present for the entire procedure    Patient Disposition:  Critical Care Unit for recovery      SIGNATURE: Lalo Paz MD  DATE: June 12, 2022  TIME: 10:18 AM

## 2022-06-12 NOTE — ASSESSMENT & PLAN NOTE
57-year-old gentleman with PA D status post stenting, tobacco use, COPD, and hypertension presented to the hospital with epistaxis  · Status post nasal cauterization today  Epistaxis has resolved  · Hemoglobin stable  · Discharge:  Medically stable for discharge at any time    No changes have been made to medication regimen

## 2022-06-12 NOTE — NURSING NOTE
Pt rang call bell, stating he is having a nose bleed  Pt noted to be bleeding from (r) nare at a slow but continuous rate  Dr Johana Ferrera notified via TT and picture of amount of blood sent  Orders for Morphine 4mg IV x1 dose and to give afrin to (r) nare  Dr Johana Ferrera to come in to pack (r) nare

## 2022-06-12 NOTE — ANESTHESIA PREPROCEDURE EVALUATION
Procedure:  CAUTERIZATION NASAL /SEPTAL (Bilateral Nose)    Relevant Problems   CARDIO   (+) Atrial ectopy   (+) Peripheral vascular disease (HCC)   (+) Precordial pain   (+) Uncontrolled hypertension      /RENAL   (+) BPH (benign prostatic hyperplasia)      MUSCULOSKELETAL   (+) Osteoarthritis      NEURO/PSYCH   (+) History of COPD      Other   (+) Left-sided epistaxis   (+) Tobacco abuse             Anesthesia Plan  ASA Score- 3 Emergent    Anesthesia Type- general with ASA Monitors  Additional Monitors:   Airway Plan: ETT  Plan Factors-    Chart reviewed  Induction- rapid sequence induction and intravenous  Postoperative Plan- Plan for postoperative opioid use  Planned trial extubation    Informed Consent- Anesthetic plan and risks discussed with patient  I personally reviewed this patient with the CRNA  Discussed and agreed on the Anesthesia Plan with the CRNA  Kwesi Emanuel

## 2022-06-12 NOTE — ASSESSMENT & PLAN NOTE
· Chronic hyponatremia would recommend outpatient follow-up with nephrology  · May be in the setting of underlying COPD    Results from last 7 days   Lab Units 06/12/22  0434 06/11/22  1411   SODIUM mmol/L 124* 127*

## 2022-06-12 NOTE — PROGRESS NOTES
Progress Note - Irene Lorenz 59 y o  male MRN: [id number]    Unit/Bed#: -01 Encounter: [id number]      Assessment:  Epistaxis controlled with nasal endoscopy and cautery  Plan: We are going to discharge the patient to home on nasal gel in the form of both the tube and spray  He will use at least 4 times daily  He has been instructed not to blow his nose into sniff gently  He will use Tylenol for pain  He will hold his aspirin  He will finish the course of cefuroxime as prescribed  I will see him in follow-up this Friday  Subjective:   Patient feeling much better  He has had no further epistaxis since the OR this morning  Objective:     Vitals: Blood pressure 146/80, pulse 84, temperature (!) 97 3 °F (36 3 °C), resp  rate 16, height 6' (1 829 m), SpO2 92 %  ,Body mass index is 17 63 kg/m²  Intake/Output Summary (Last 24 hours) at 6/12/2022 1844  Last data filed at 6/12/2022 1753  Gross per 24 hour   Intake 1450 ml   Output --   Net 1450 ml       Physical Exam:   Nasal exam:  No evidence of bleeding  Oral cavity oropharynx:  No blood noted  Invasive Devices  Report    Peripheral Intravenous Line  Duration           Peripheral IV 06/11/22 Right Antecubital <1 day                Lab, Imaging and other studies: I have personally reviewed pertinent reports      VTE Pharmacologic Prophylaxis: Reason for no pharmacologic prophylaxis Patient fully mobile  VTE Mechanical Prophylaxis: reason for no mechanical VTE prophylaxis Patient fully mobile

## 2022-06-12 NOTE — DISCHARGE SUMMARY
Discharge Summary - Amanda Friedman 59 y o  male MRN: [id number]    Unit/Bed#: -01 Encounter: [id number]    Admission Date:   Admission Orders (From admission, onward)     Ordered        06/11/22 2028  Place in Observation  Once                        Admitting Diagnosis: Epistaxis [R04 0]  Primary hypertension [I10]  Bleeding from the nose [R04 0]    HPI:  The patient has presented to the emergency room 3 times a day with epistaxis mostly light left-sided is required packing and cautery  Even though he has been packed he continued to bleed  He is being admitted now for observation with possible nasal endoscopy and cautery of bleeding sites  Procedures Performed:  Bilateral nasal endoscopy with cautery of bleeding sites in both nasal cavities    Summary of Hospital Course: The patient was admitted after he was in the emergency room and then started having right-sided epistaxis  His left nostril was packed  Because of this, he was taken the operating room this morning underwent bilateral nasal endoscopy with cautery of the bleeding sites  No further packing was placed  He was able to be discharged home on cefuroxime and Tylenol for pain  He will see me in follow-up this Friday  Significant Findings, Care, Treatment and Services Provided: There was some abnormalities with his electrolytes and nephrology consult was recommended by Medicine    Complications:  None    Discharge Diagnosis:  Severe bilateral epistaxis    Medical Problems             Resolved Problems  Date Reviewed: 6/12/2022   None                 Condition at Discharge: good         Discharge instructions/Information to patient and family:   See after visit summary for information provided to patient and family  Provisions for Follow-Up Care:  See after visit summary for information related to follow-up care and any pertinent home health orders        PCP: Tobie Brunner, MD    Disposition: Home    Planned Readmission: No      Discharge Statement   I spent 30 minutes discharging the patient  This time was spent on the day of discharge  I had direct contact with the patient on the day of discharge  Additional documentation is required if more than 30 minutes were spent on discharge  Discharge Medications:  See after visit summary for reconciled discharge medications provided to patient and family

## 2022-06-12 NOTE — NURSING NOTE
Pt rang call bell and stated he spit up blood  This nurse assessed nose and noted that there was no active bleeding  Packing to (l) nostril noted to be slightly pink tinged  Dr Isabel Wesley made aware via TT with picture sent of what pt coughed up  MD stated to monitor and stated if it worsens to let him know  MD will be in early tomorrow AM to assess pt

## 2022-06-12 NOTE — ASSESSMENT & PLAN NOTE
· 8/19- [facility] with kissing iliac [facility]  · PTA ASA on hold due to severe epistaxis  · Resume when cleared by ENT

## 2022-06-12 NOTE — UTILIZATION REVIEW
Initial Clinical Review    Admission: Date/Time/Statement:   Admission Orders (From admission, onward)     Ordered        06/11/22 2028  Place in Observation  Once                      Orders Placed This Encounter   Procedures    Place in Observation     Standing Status:   Standing     Number of Occurrences:   1     Order Specific Question:   Admitting Physician     Answer:   Fermin Freire     Order Specific Question:   Level of Care     Answer:   Med Surg [16]     ED Arrival Information     Expected   -    Arrival   6/11/2022 18:19    Acuity   Urgent            Means of arrival   Walk-In    Escorted by   Self    Service   ENT    Admission type   Urgent            Arrival complaint   Nose bleed           Chief Complaint   Patient presents with    Nose Bleed     Pt back again for 3rd time in 24 hrs for nose bleed  Denies dizziness  Initial Presentation: 59 y o  male with PMH of HTN,PVD, COPD and chronic hyponatremia and intermittent epistaxis,  presents to the ED from home for a third time with left sided epistaxis  Today it started early this morning and he was initially treated with some cautery and superior nasal cavity packing  Patient was bleeding through the packing of the L nostril and returned to the ED in the early afternoon  He was discharged to home, returned to the ED this evening, 10 cm Merocel sponge was placed in left nasal cavity after silver nitrate cautery was performed on bleeding sites most notable at the anterior superior portion of the large left-sided nasal septal perforation which he has as result of the previous nasal and sinus surgery  He had controlled the epistaxis with this last cautery and packing  6/11 Plan: Admit to Observation for evaluation and treatment of recurrent epistaxis: Continue Ceftin 250 mg BID, pain control, consult Internal Medicine for blood pressure control  NPO at [facility] for possible OR tomorrow       6/11 Internal Medicine: BP in the -170, received clonidine with improvement  Continue PTA lisinopril, labetalol 10 mg Q6H PRN  Sodium 127, neurological exam intact  Repeat sodium in a m      6/12 ENT:  Patient with onset of bleeding from R nostril  Known septal perforation, L nasal cavity is packed  Hemoglobin dropped from 13 7-12  3  Plan: To OR for nasal endoscopy with B/L nasal cautery, possibly place packing depending on findings  6/12 Operative Report:     Procedure(s) : CAUTERIZATION NASAL /SEPTAL (Bilateral)    Anesthesia: General    Operative Findings: Diffuse bleeding sites involving the anterior septum and around the large septal perforation as well as the left middle turbinate  Right-sided epistaxis was also inferiorly anteriorly around the nasal septal perforation and along the right middle turbinate    6/12 Internal Medicine:  S/p nasal cauterization today  Epistaxis has resolved  Medically stable for discharge when deemed appropriate by primary service             ED Triage Vitals   Temperature Pulse Respirations Blood Pressure SpO2   06/11/22 1824 06/11/22 1824 06/11/22 1824 06/11/22 1824 06/11/22 1824   98 2 °F (36 8 °C) 87 18 (!) 173/94 100 %      Temp Source Heart Rate Source Patient Position - Orthostatic VS BP Location FiO2 (%)   06/11/22 1824 06/11/22 1845 06/11/22 1824 06/11/22 1824 --   Temporal Right;Radial Sitting Right arm       Pain Score       06/11/22 1845       No Pain          Wt Readings from Last 1 Encounters:   06/11/22 59 kg (130 lb)     Additional Vital Signs:       Date/Time Temp Pulse Resp BP MAP (mmHg) SpO2 Calculated FIO2 (%) - Nasal Cannula O2 Flow Rate (L/min) Nasal Cannula O2 Flow Rate (L/min) O2 Device   06/12/22 1025 98 °F (36 7 °C) 85 18 154/80 -- 94 % 28 -- 2 L/min    06/12/22 1017 98 °F (36 7 °C) 90 16 151/88 -- 92 % 28 -- 2 L/min Nasal cannula   06/12/22 1002 97 8 °F (36 6 °C) 88 14 142/85 -- 96 % -- 4 L/min -- Simple mask   06/12/22 08:26:44 -- 75 -- 171/85 Abnormal  114 94 % -- -- -- --   06/12/22 07:30:10 96 7 °F (35 9 °C) Abnormal  83 18 172/83 Abnormal  113 93 % -- -- -- --   06/12/22 0402 -- 80 -- 147/82 104 96 % -- -- -- --   06/12/22 00:09:59 -- 72 -- 145/85 105 97 % -- -- -- --   06/11/22 23:17:07 -- 88 -- 164/100 121 95 % -- -- -- --   06/11/22 22:34:39 97 6 °F (36 4 °C) 89 17 164/111 Abnormal  129 95 % -- -- -- --   06/11/22 2130 -- -- -- -- -- -- -- -- -- None (Room air)   06/11/22 21:19:30 97 7 °F (36 5 °C) 90 17 165/113 Abnormal  130 96 % -- -- -- --   06/11/22 2012 -- 78 19 162/91 120 97 % -- -- -- --   06/11/22 1912 -- -- -- -- -- 100 % -- -- -- --   06/11/22 1845 -- 85 17 142/80 105 99 % -- -- -- None (Room air)         Pertinent Labs/Diagnostic Test Results:         Results from last 7 days   Lab Units 06/12/22  0434 06/11/22  1411   WBC Thousand/uL 4 90 5 27   HEMOGLOBIN g/dL 12 3 13 7   HEMATOCRIT % 34 4* 37 6   PLATELETS Thousands/uL 251 307   NEUTROS ABS Thousands/µL  --  3 82         Results from last 7 days   Lab Units 06/12/22  0434 06/11/22  1411   SODIUM mmol/L 124* 127*   POTASSIUM mmol/L 4 1 4 2   CHLORIDE mmol/L 91* 92*   CO2 mmol/L 24 25   ANION GAP mmol/L 9 10   BUN mg/dL 8 11   CREATININE mg/dL 0 72 1 23   EGFR ml/min/1 73sq m 99 62   CALCIUM mg/dL 8 0* 8 6             Results from last 7 days   Lab Units 06/12/22  0434 06/11/22  1411   GLUCOSE RANDOM mg/dL 129 101         Results from last 7 days   Lab Units 06/11/22  1427   PROTIME seconds 12 5   INR  0 94   PTT seconds 31     Results from last 7 days   Lab Units 06/12/22  0434   TSH 3RD GENERATON uIU/mL 2 765       Results from last 7 days   Lab Units 06/12/22  0807   OSMO UR mmol/     Results from last 7 days   Lab Units 06/12/22  0807   CLARITY UA  Clear   COLOR UA  Yellow   SPEC GRAV UA  1 015   PH UA  7 0   GLUCOSE UA mg/dl Negative   KETONES UA mg/dl Negative   BLOOD UA  Negative   PROTEIN UA mg/dl Negative   NITRITE UA  Negative   BILIRUBIN UA  Negative   UROBILINOGEN UA E U /dl 0 2   LEUKOCYTES UA  Negative   WBC UA /hpf None Seen   RBC UA /hpf 0-1*   BACTERIA UA /hpf None Seen   EPITHELIAL CELLS WET PREP /hpf None Seen   SODIUM UR  69           ED Treatment:   Medication Administration from 06/11/2022 1819 to 06/11/2022 2114       Date/Time Order Dose Route Action     06/11/2022 1929 cocaine 40 mg/mL topical solution 4 mL Topical Given     06/11/2022 1955 bacitracin topical ointment 1 large application 1 large application Topical Given     06/11/2022 2002 silver nitrate-potassium nitrate (ARZOL SILVER NITRATE) 61-37 % applicator 1 applicator 1 applicator Topical Given        Past Medical History:   Diagnosis Date    [facility]cardia ([facility] )     BPH (benign prostatic hyperplasia)     COPD (chronic obstructive pulmonary disease) (HCC)     Hypertension     Osteoarthritis     Peripheral vascular disease (HCC)     stenting b/l iliac stenting 8/12/2019    Severe epistaxis      Present on Admission:   [facility]   Tobacco abuse   Chronic hyponatremia   Atrial tachycardia (HCC)      Admitting Diagnosis: Epistaxis [R04 0]  Primary hypertension [I10]  Bleeding from the nose [R04 0]  Age/Sex: 59 y o  male       Admission Orders:    Scheduled Medications:    cefuroxime, 500 mg, Oral, Q12H [address]  lisinopril, 30 mg, Oral, Daily  sodium chloride, 1 application, Nasal, 4x Daily      Continuous IV Infusions:     PRN Meds:  acetaminophen, 650 mg, Oral, Q6H PRN  HYDROcodone-acetaminophen, 1 tablet, Oral, Q4H PRN  abetalol, 10 mg, Intravenous, Q6H PRN   morphine injection, 4 mg, Intravenous, Q3H PRN  ondansetron, 4 mg, Intravenous, Q6H PRN   ] oxymetazoline, 2 spray, Each Nare, PRN        IP CONSULT TO INTERNAL MEDICINE    Network Utilization Review Department  ATTENTION: Please call with any questions or concerns to [phone] and carefully listen to the prompts so that you are directed to the right person   All voicemails are confidential   Richie Hallmark all requests for admission clinical reviews, approved or denied determinations and any other requests to dedicated fax number below belonging to the campus where the patient is receiving treatment   List of dedicated fax numbers for the Facilities:  [address] [place] [address] DENIALS (Administrative/Medical Necessity) [phone]   [address] (Maternity/NICU/Pediatrics) [phone]   [address]  [address] [id number] [place] [address] [address] [facility] [place] [address] [phone]

## 2022-06-12 NOTE — ASSESSMENT & PLAN NOTE
· Admitted by ENT for left-sided epistaxis with posterior packing and cautery  · Continue Ceftin  · Care per ENT

## 2022-06-12 NOTE — DISCHARGE INSTRUCTIONS
Finish the cefuroxime 250 mg twice daily as instructed  Tylenol as needed for pain  Nasal gel in both the tube and spray form 4 times daily  You may use it more often if the nose feels dry  No heavy lifting, stooping, or straining  No nose blowing  Sneeze with mouth open

## 2022-06-12 NOTE — CONSULTS
[address] [facility] 1960, 59 y o  male MRN: [id number]  Unit/Bed#: -01 Encounter: [id number]  Primary Care Provider: Gifty Mcneil MD   Date and time admitted to hospital: 6/11/2022  6:22 PM    Inpatient consult to Internal Medicine  Consult performed by: EDDIE Loving  Consult ordered by: Cale Briggs MD        Uncontrolled hypertension  Assessment & Plan  · History of hypertension maintained on lisinopril 30 mg daily- reports blood pressure is normally controlled at home  · 978-356 systolic in ED - received clonidine x1 with improvement  · Continue PTA lisinopril  · Labetalol 10 mg q 6 hours p r n  · Trend blood pressures    * Left-sided epistaxis  Assessment & Plan  · Admitted by ENT for left-sided epistaxis with posterior packing and cautery  · Continue Ceftin  · Care per ENT    Peripheral vascular disease ([facility] )  Assessment & Plan  · 8/19- aortogram with kissing iliac stent graft to treat severe bilateral claudication at [facility]  · PTA ASA on hold due to severe epistaxis  · Resume when cleared by ENT      Tobacco abuse  Assessment & Plan  · Daily tobacco abuse x 52 years  · Declines nicotine patch  · Decline cessation counseling    Chronic hyponatremia  Assessment & Plan  · Sodium 127 on admission- neurological exam intact  · Review of care everywhere, sodium was 127 in August of 2019 at outside hospital at time of aortogram and iliac stenting, sodium 131 in 2017  · Repeat sodium in a m   · Outpatient follow-up is highly recommended    Moderate protein-calorie malnutrition ([facility] )  Assessment & Plan  Malnutrition Findings: Loss of subcutaneous fat in orbital, triceps, ribcage areas  Consultation to dietitian      BMI Findings: Body mass index is 17 63 kg/m²         History of COPD  Assessment & Plan  · No acute exacerbation  · Continue to monitor pulmonary exam and oxygen requirements    VTE Prophylaxis:   Moderate Risk (Score 3-4) - Pharmacological DVT Prophylaxis Contraindicated  Sequential Compression Devices Ordered  Recommendations for Discharge:  · Continue previous home medications on discharge    Counseling / Coordination of Care Time: 30 minutes Greater than 50% of total time spent on patient counseling and coordination of care  Collaboration of Care: Were Recommendations Directly Discussed with Primary Treatment Team? Yes    History of Present Illness:  Geetha De La Garza is a 59 y o  male with PMH of PAD s/p iliac stent grafting 8/19, HTN, COPD, tobacco abuse who is originally admitted to the ENT service due to left-sided epistaxis requiring posterior packing and cautery in the emergency department  Per ENT patient with longstanding history of left-sided epistaxis required sphenopalatine artery ligation 5 years ago at [facility] and follows in the local ENT office occasionally for recurrent epistaxis  On day of admission he developed epistaxis earlier in the morning and was seen in the emergency department x2 with cautery and nasal packing  Patient return to the ER this evening, was evaluated by ENT and admitted under observation with packing in place with multiple comorbidities  Hypertensive in the emergency department, requiring clonidine  We are consulted for medical management of uncontrolled hypertension, PAD, COPD, tobacco abuse  Review of Systems:  Review of Systems   Constitutional: Negative for chills and fever  HENT: Positive for nosebleeds  Negative for ear pain and sore throat  Eyes: Negative for pain and visual disturbance  Respiratory: Negative for cough and shortness of breath  Cardiovascular: Negative for chest pain and palpitations  Gastrointestinal: Negative for abdominal pain and vomiting  Genitourinary: Negative for dysuria and hematuria  Musculoskeletal: Negative for arthralgias and back pain  Skin: Negative for color change and rash     Neurological: Negative for seizures and syncope  All other systems reviewed and are negative  Past Medical and Surgical History:   Past Medical History:   Diagnosis Date    BPH (benign prostatic hyperplasia)     [facility] obstructive [facility]) [facility]     Hypertension     Osteoarthritis     Peripheral vascular disease ([facility] )     stenting b/l iliac stenting 2019    Severe epistaxis        Past Surgical History:   Procedure Laterality Date    HERNIA REPAIR      ILIAC VEIN ANGIOPLASTY / STENTING Bilateral 2019    Dr Mihaela Jeffers at [address]   NOSE SURGERY      arterial clip due to epistaxis    SKIN CANCER EXCISION      VASECTOMY         Meds/Allergies:  all medications and allergies reviewed    Allergies: No Known Allergies    Social History:  Marital Status: /Civil Union  Substance Use History:   Social History     Substance and Sexual Activity   Alcohol Use Yes    Comment: 5-6 beers a day     Social History     Tobacco Use   Smoking Status Current Every Day Smoker    Packs/day: 1 00    Types: Cigarettes   Smokeless Tobacco Never Used     Social History     Substance and Sexual Activity   Drug Use Never       Family History:  Family History   Problem Relation Age of Onset    Lymphoma Mother     Colon cancer Father     Heart attack Brother     Heart attack Paternal Grandfather          61    Heart attack Brother     Substance Abuse Brother        Physical Exam:   Vitals:   Blood Pressure: 145/85 (22)  Pulse: 72 (22)  Temperature: 97 6 °F (36 4 °C) (22)  Temp Source: Temporal (22)  Respirations: 17 (22)  Height: 6' (182 9 cm) (22 0037)  SpO2: 97 % (22)    Physical Exam  Vitals and nursing note reviewed  Constitutional:       Appearance: He is well-developed and underweight  HENT:      Head: Normocephalic and atraumatic        Mouth/Throat:      Mouth: Mucous membranes are moist    Eyes:      Conjunctiva/sclera: Conjunctivae normal  Cardiovascular:      Rate and Rhythm: Normal rate and regular rhythm  Heart sounds: No murmur heard  Pulmonary:      Effort: Pulmonary effort is normal  No respiratory distress  Breath sounds: Rhonchi present  Abdominal:      Palpations: Abdomen is soft  Tenderness: There is no abdominal tenderness  Musculoskeletal:         General: No swelling  Normal range of motion  Cervical back: Neck supple  Skin:     General: Skin is warm and dry  Capillary Refill: Capillary refill takes less than 2 seconds  Neurological:      General: No focal deficit present  Mental Status: He is alert and oriented to person, place, and time  Psychiatric:         Mood and Affect: Mood normal          Behavior: Behavior normal           Additional Data:   Lab Results:    Results from last 7 days   Lab Units 06/11/22  1411   WBC Thousand/uL 5 27   HEMOGLOBIN g/dL 13 7   HEMATOCRIT % 37 6   PLATELETS Thousands/uL 307   NEUTROS PCT % 73   LYMPHS PCT % 12*   MONOS PCT % 12   EOS PCT % 2     Results from last 7 days   Lab Units 06/11/22  1411   SODIUM mmol/L 127*   POTASSIUM mmol/L 4 2   CHLORIDE mmol/L 92*   CO2 mmol/L 25   BUN mg/dL 11   CREATININE mg/dL 1 23   ANION GAP mmol/L 10   CALCIUM mg/dL 8 6   GLUCOSE RANDOM mg/dL 101     Results from last 7 days   Lab Units 06/11/22  1427   INR  0 94         No results found for: HGBA1C            Imaging: No pertinent imaging reviewed  No orders to display       EKG, Pathology, and Other Studies Reviewed on Admission:   · All    ** Please Note: This note may have been constructed using a voice recognition system   **

## 2022-06-12 NOTE — ANESTHESIA POSTPROCEDURE EVALUATION
Post-Op Assessment Note    CV Status:  Stable  Pain Score: 0    Pain management: adequate     Mental Status:  Alert and awake   Hydration Status:  Euvolemic   PONV Controlled:  Controlled   Airway Patency:  Patent      Post Op Vitals Reviewed: Yes      Staff: CRNA         No complications documented      /85 (06/12/22 1002)    Temp 97 8 °F (36 6 °C) (06/12/22 1002)    Pulse 88 (06/12/22 1002)   Resp 14 (06/12/22 1002)    SpO2 96 % (06/12/22 1002)

## 2022-06-12 NOTE — ASSESSMENT & PLAN NOTE
· Sodium 127 on admission- neurological exam intact  · Review of care everywhere, sodium was 127 in August of 2019 at outside hospital at time of aortogram and iliac stenting, sodium 131 in 2017  · Repeat sodium in a m   · Outpatient follow-up is highly recommended

## 2022-06-12 NOTE — NURSING NOTE
At 2234 6/11/22 pt's b/p elevated 164/111 when arrived to unit  Pt encouraged to relax  Pt's b/p rechecked and still elevated at 2317 164/100  On call SANTANA CUETO, made aware and palced order for IV labetalol  Pt's b/p rechecked at 0009 145/85

## 2022-06-12 NOTE — ASSESSMENT & PLAN NOTE
Malnutrition Findings: Loss of subcutaneous fat in orbital, triceps, ribcage areas  Consultation to dietitian      BMI Findings: Body mass index is 17 63 kg/m²

## 2022-06-12 NOTE — ASSESSMENT & PLAN NOTE
· History of hypertension maintained on lisinopril 30 mg daily- reports blood pressure is normally controlled at home  · 650-590 systolic in ED - received clonidine x1 with improvement  · Continue PTA lisinopril  · Labetalol 10 mg q 6 hours p r n    · Trend blood pressures

## 2022-06-12 NOTE — NURSING NOTE
Reviewed discharge instructions, med rec , follow up appointments, worsening s/s when to return to ER or call PCP

## 2022-06-13 LAB
ATRIAL RATE: 67 BPM
P AXIS: 84 DEGREES
PR INTERVAL: 152 MS
QRS AXIS: 64 DEGREES
QRSD INTERVAL: 72 MS
QT INTERVAL: 404 MS
QTC INTERVAL: 426 MS
T WAVE AXIS: 83 DEGREES
VENTRICULAR RATE: 67 BPM

## 2023-01-05 ENCOUNTER — CONSULT (OUTPATIENT)
Dept: PULMONOLOGY | Facility: CLINIC | Age: 63
End: 2023-01-05

## 2023-01-05 VITALS
TEMPERATURE: 96.9 F | HEIGHT: 71 IN | SYSTOLIC BLOOD PRESSURE: 175 MMHG | BODY MASS INDEX: 18.51 KG/M2 | WEIGHT: 132.2 LBS | HEART RATE: 83 BPM | OXYGEN SATURATION: 95 % | DIASTOLIC BLOOD PRESSURE: 83 MMHG

## 2023-01-05 DIAGNOSIS — Z87.09 HISTORY OF COPD: ICD-10-CM

## 2023-01-05 DIAGNOSIS — Z72.0 TOBACCO ABUSE: Primary | ICD-10-CM

## 2023-01-05 RX ORDER — ROFLUMILAST 500 UG/1
500 TABLET ORAL DAILY
Qty: 30 TABLET | Refills: 5 | Status: SHIPPED | OUTPATIENT
Start: 2023-01-05

## 2023-01-05 NOTE — ASSESSMENT & PLAN NOTE
He continues to smoke about a pack per day  We will need to continue to address this in follow up to help improve his current condition

## 2023-01-05 NOTE — ASSESSMENT & PLAN NOTE
It is fair to presume he has this diagnosis based on the severe hyperinflation seen on his plain chest x-ray      · Sample of Stiolto provided, demonstrated its use in the office  · Discontinue Advair  · Will write for Kinsey Freire given his positive therapeutic response to sildenafil and chronic bronchitic phenotype  · Check alpha-1 antitrypsin level  · Check full PFTs  · Patient declined lung cancer screening

## 2023-01-13 ENCOUNTER — TELEPHONE (OUTPATIENT)
Dept: PULMONOLOGY | Facility: CLINIC | Age: 63
End: 2023-01-13

## 2023-01-13 DIAGNOSIS — J44.9 CHRONIC OBSTRUCTIVE PULMONARY DISEASE, UNSPECIFIED COPD TYPE (HCC): Primary | ICD-10-CM

## 2023-01-13 RX ORDER — TIOTROPIUM BROMIDE AND OLODATEROL 3.124; 2.736 UG/1; UG/1
2 SPRAY, METERED RESPIRATORY (INHALATION) DAILY
Qty: 4 G | Refills: 11 | Status: SHIPPED | OUTPATIENT
Start: 2023-01-13 | End: 2023-01-18

## 2023-01-13 RX ORDER — TIOTROPIUM BROMIDE AND OLODATEROL 3.124; 2.736 UG/1; UG/1
2 SPRAY, METERED RESPIRATORY (INHALATION) DAILY
Qty: 12 G | Refills: 3 | Status: CANCELLED | OUTPATIENT
Start: 2023-01-13 | End: 2023-02-12

## 2023-01-13 NOTE — TELEPHONE ENCOUNTER
Pt has called in stating he received a sample of the STIOLTO inhaler last visit to use for 2 weeks  Pt states the inhaler has been working well and is requesting a rx be put into the [facility] on file with refills   Please advise

## 2023-01-18 RX ORDER — UMECLIDINIUM BROMIDE AND VILANTEROL TRIFENATATE 62.5; 25 UG/1; UG/1
1 POWDER RESPIRATORY (INHALATION) DAILY
Qty: 60 BLISTER | Refills: 5 | Status: SHIPPED | OUTPATIENT
Start: 2023-01-18 | End: 2023-02-17

## 2023-02-17 DIAGNOSIS — J44.9 CHRONIC OBSTRUCTIVE PULMONARY DISEASE, UNSPECIFIED COPD TYPE (HCC): Primary | ICD-10-CM

## 2023-02-17 RX ORDER — TIOTROPIUM BROMIDE AND OLODATEROL 3.124; 2.736 UG/1; UG/1
2 SPRAY, METERED RESPIRATORY (INHALATION) DAILY
Qty: 4 G | Refills: 5 | Status: SHIPPED | OUTPATIENT
Start: 2023-02-17 | End: 2023-03-19

## 2023-03-08 ENCOUNTER — TELEPHONE (OUTPATIENT)
Dept: PULMONOLOGY | Facility: CLINIC | Age: 63
End: 2023-03-08

## 2023-06-12 ENCOUNTER — TELEPHONE (OUTPATIENT)
Dept: PULMONOLOGY | Facility: CLINIC | Age: 63
End: 2023-06-12

## 2023-07-21 ENCOUNTER — HOSPITAL ENCOUNTER (OUTPATIENT)
Dept: PULMONOLOGY | Facility: HOSPITAL | Age: 63
End: 2023-07-21
Attending: INTERNAL MEDICINE
Payer: MEDICARE

## 2023-07-21 DIAGNOSIS — Z87.09 HISTORY OF COPD: ICD-10-CM

## 2023-07-21 PROCEDURE — 94060 EVALUATION OF WHEEZING: CPT

## 2023-07-21 PROCEDURE — 94060 EVALUATION OF WHEEZING: CPT | Performed by: INTERNAL MEDICINE

## 2023-07-21 PROCEDURE — 94760 N-INVAS EAR/PLS OXIMETRY 1: CPT

## 2023-07-21 PROCEDURE — 94726 PLETHYSMOGRAPHY LUNG VOLUMES: CPT

## 2023-07-21 PROCEDURE — 94729 DIFFUSING CAPACITY: CPT

## 2023-07-21 PROCEDURE — 94726 PLETHYSMOGRAPHY LUNG VOLUMES: CPT | Performed by: INTERNAL MEDICINE

## 2023-07-21 PROCEDURE — 94729 DIFFUSING CAPACITY: CPT | Performed by: INTERNAL MEDICINE

## 2023-07-21 RX ORDER — ALBUTEROL SULFATE 2.5 MG/3ML
2.5 SOLUTION RESPIRATORY (INHALATION) ONCE AS NEEDED
Status: COMPLETED | OUTPATIENT
Start: 2023-07-21 | End: 2023-07-21

## 2023-07-21 RX ADMIN — ALBUTEROL SULFATE 2.5 MG: 2.5 SOLUTION RESPIRATORY (INHALATION) at 09:38

## 2023-12-28 ENCOUNTER — OFFICE VISIT (OUTPATIENT)
Dept: URGENT CARE | Facility: CLINIC | Age: 63
End: 2023-12-28
Payer: MEDICARE

## 2023-12-28 ENCOUNTER — APPOINTMENT (OUTPATIENT)
Dept: RADIOLOGY | Facility: CLINIC | Age: 63
End: 2023-12-28
Payer: MEDICARE

## 2023-12-28 VITALS
OXYGEN SATURATION: 92 % | SYSTOLIC BLOOD PRESSURE: 169 MMHG | RESPIRATION RATE: 20 BRPM | WEIGHT: 134.8 LBS | TEMPERATURE: 97.2 F | HEART RATE: 94 BPM | HEIGHT: 72 IN | BODY MASS INDEX: 18.26 KG/M2 | DIASTOLIC BLOOD PRESSURE: 91 MMHG

## 2023-12-28 DIAGNOSIS — R05.9 COUGH, UNSPECIFIED TYPE: ICD-10-CM

## 2023-12-28 DIAGNOSIS — J40 BRONCHITIS: Primary | ICD-10-CM

## 2023-12-28 PROCEDURE — 99213 OFFICE O/P EST LOW 20 MIN: CPT

## 2023-12-28 PROCEDURE — G0463 HOSPITAL OUTPT CLINIC VISIT: HCPCS

## 2023-12-28 PROCEDURE — 71046 X-RAY EXAM CHEST 2 VIEWS: CPT

## 2023-12-28 RX ORDER — PREDNISONE 20 MG/1
40 TABLET ORAL DAILY
Qty: 10 TABLET | Refills: 0 | Status: SHIPPED | OUTPATIENT
Start: 2023-12-28 | End: 2024-01-02

## 2023-12-28 RX ORDER — ALBUTEROL SULFATE 90 UG/1
2 AEROSOL, METERED RESPIRATORY (INHALATION) EVERY 6 HOURS PRN
COMMUNITY

## 2023-12-28 RX ORDER — BENZONATATE 100 MG/1
100 CAPSULE ORAL 3 TIMES DAILY PRN
Qty: 20 CAPSULE | Refills: 0 | Status: SHIPPED | OUTPATIENT
Start: 2023-12-28 | End: 2024-01-04

## 2023-12-28 RX ORDER — AZITHROMYCIN 250 MG/1
TABLET, FILM COATED ORAL
Qty: 6 TABLET | Refills: 0 | Status: SHIPPED | OUTPATIENT
Start: 2023-12-28 | End: 2024-01-01

## 2023-12-28 NOTE — PROGRESS NOTES
[facility]        NAME: Mihir Aldrich is a 63 y.o. male  : 1960    MRN: [id number]  DATE: 2023  TIME: 10:42 AM    Assessment and Plan   Bronchitis [J40]  1. Bronchitis  XR chest pa & lateral    azithromycin (ZITHROMAX) 250 mg tablet    predniSONE 20 mg tablet    benzonatate (TESSALON PERLES) 100 mg capsule        Lungs clear on PE and VSS. Preliminary chest XR read negative for acute abnormality (appears similar to prior) but final read pending.  Will treat with azithromycin, oral steroids, and Tessalon Perles as needed. Encouraged continued supportive measures.  Follow up with PCP in 3-5 days or proceed to emergency department for worsening symptoms.  Patient verbalized understanding of instructions given.       Patient Instructions     Patient Instructions     Preliminary chest XR read negative, final read pending  Take antibiotic as prescribed  Take oral steroids as prescribed and continue using inhaler as needed  Continue with supportive measures, OTC Tylenol/Ibuprofen, nasal decongestants, and cough suppressants (Tessalon Perles)   Cool mist humidifiers, throat lozenges, increased fluid intake and rest   Follow up with PCP in 3-5 days  Present to ER if symptoms worsen     Acute Bronchitis   AMBULATORY CARE:   Acute bronchitis  is swelling and irritation in your lungs. It is usually caused by a virus and most often happens in the winter. Bronchitis may also be caused by bacteria or by a chemical irritant, such as smoke.  Common symptoms:   Cough that lasts up to 3 weeks    Runny or stuffy nose    Hoarseness, sore throat    Fever    Feeling more tired than usual, and body aches    Wheezing or pain when you breathe or cough    Seek care immediately if:   You cough up blood.    Your lips or fingernails turn blue.    You feel like you are not getting enough air when you breathe.    Call your doctor if:   Your symptoms do not go away or get worse, even after treatment.    Your  cough does not get better within 4 weeks.    You have questions or concerns about your condition or care.    Medicines:  You may need any of the following:  Cough suppressants  decrease your urge to cough.    Decongestants  help loosen mucus in your lungs and make it easier to cough up. This can help you breathe easier.    Inhalers  may be given. Your healthcare provider may give you one or more inhalers to help you breathe easier and cough less. An inhaler gives you medicine to open your airways. Ask your healthcare provider to show you how to use your inhaler correctly.         Antiviral medicine  treats infections caused by a virus.    Antibiotics  may be given if your bronchitis is caused by bacteria or if you have lung condition.    Acetaminophen  decreases pain and fever. It is available without a doctor's order. Ask how much to take and how often to take it. Follow directions. Read the labels of all other medicines you are using to see if they also contain acetaminophen, or ask your doctor or pharmacist. Acetaminophen can cause liver damage if not taken correctly.    NSAIDs  help decrease swelling and pain or fever. This medicine is available with or without a doctor's order. NSAIDs can cause stomach bleeding or kidney problems in certain people. If you take blood thinner medicine, always ask your healthcare provider if NSAIDs are safe for you. Always read the medicine label and follow directions.    Self-care:   Drink liquids as directed.  You may need to drink more liquids than usual to stay hydrated. Ask how much liquid to drink each day and which liquids are best for you.    Use a cool mist humidifier.  This increases air moisture in your home. This may make it easier for you to breathe and help decrease your cough.     Get more rest.  Rest helps your body to heal. Slowly start to do more each day. Rest when you feel it is needed.    Prevent acute bronchitis:       Ask about vaccines you may need.  Get a  flu vaccine each year as soon as recommended, usually in September or October. Ask your healthcare provider if you should also get a pneumonia or COVID-19 vaccine. Your healthcare provider can tell you if you should also get other vaccines, and when to get them.    Prevent the spread of germs.  You can decrease your risk for acute bronchitis and other illnesses by doing the following:    Wash your hands often with soap and water. Carry germ-killing hand lotion or gel with you. You can use the lotion or gel to clean your hands when soap and water are not available.         Do not touch your eyes, nose, or mouth unless you have washed your hands first.    Always cover your mouth when you cough to prevent the spread of germs. It is best to cough into a tissue or your shirt sleeve instead of into your hand. Ask those around you to cover their mouths when they cough.    Try to avoid people who have a cold or the flu. If you are sick, stay away from others as much as possible.    Avoid irritants in the air.  Avoid chemicals, fumes, and dust. Wear a face mask if you must work around dust or fumes. Stay inside on days when air pollution levels are high. If you have allergies, stay inside when pollen counts are high. Do not use aerosol products, such as spray-on deodorant, bug spray, and hair spray.    Do not smoke or be around others who are smoking.  Nicotine and other chemicals in cigarettes and cigars can cause lung damage. Ask your healthcare provider for information if you currently smoke and need help to quit. E-cigarettes or smokeless tobacco still contain nicotine. Talk to your healthcare provider before you use these products.  Follow up with your doctor as directed:  Write down questions you have so you will remember to ask them during your follow-up visits.  © Copyright [employer] 2023 Information is for End User's use only and may not be sold, redistributed or otherwise used for commercial purposes.  The above  information is an  only. It is not intended as medical advice for individual conditions or treatments. Talk to your doctor, nurse or pharmacist before following any medical regimen to see if it is safe and effective for you.        Chief Complaint     Chief Complaint   Patient presents with    Cold Like Symptoms     Cough, no appetite, SOB worse with activity, and fatigued starting about 10 days ago; felt warm at beginning of illness and back pain but has since gone away; Hx of COPD         History of Present Illness       63-year-old male with a past medical history significant for hypertension and COPD presents with complaints of ongoing nasal congestion and cough x 10 days.  Patient reports occasionally productive cough with wheezing and some shortness of breath with exertion.  Subjective fever and chills which has since resolved.  No vomiting or diarrhea.  States positive sick contact/exposures as other household members recently ill.  He has been using his albuterol inhaler once daily.        Review of Systems   Review of Systems   Constitutional:  Positive for chills and fever.   HENT:  Positive for congestion and rhinorrhea. Negative for ear discharge, ear pain, sore throat, trouble swallowing and voice change.    Eyes:  Negative for discharge.   Respiratory:  Positive for cough, shortness of breath and wheezing.    Cardiovascular:  Negative for chest pain.   Gastrointestinal:  Negative for abdominal pain, diarrhea, nausea and vomiting.   Skin:  Negative for rash.         Current Medications       Current Outpatient Medications:     albuterol (PROVENTIL HFA,VENTOLIN HFA) 90 mcg/act inhaler, Inhale 2 puffs every 6 (six) hours as needed for wheezing, Disp: , Rfl:     azithromycin (ZITHROMAX) 250 mg tablet, Take 2 tablets today then 1 tablet daily x 4 days, Disp: 6 tablet, Rfl: 0    benzonatate (TESSALON PERLES) 100 mg capsule, Take 1 capsule (100 mg total) by mouth 3 (three) times a day as needed  for cough for up to 7 days, Disp: 20 capsule, Rfl: 0    lisinopril (ZESTRIL) 30 mg tablet, Take 40 mg by mouth daily, Disp: , Rfl:     multivitamin (THERAGRAN) TABS, Take 1 tablet by mouth daily, Disp: , Rfl:     predniSONE 20 mg tablet, Take 2 tablets (40 mg total) by mouth daily for 5 days, Disp: 10 tablet, Rfl: 0    tiotropium-olodaterol (Stiolto Respimat) 2.5-2.5 MCG/ACT inhaler, Inhale 2 puffs daily, Disp: 4 g, Rfl: 5    roflumilast (Daliresp) 500 mcg tablet, Take 1 tablet (500 mcg total) by mouth daily, Disp: 30 tablet, Rfl: 5    Current Allergies     Allergies as of 2023    (No Known Allergies)            The following portions of the patient's history were reviewed and updated as appropriate: allergies, current medications, past family history, past medical history, past social history, past surgical history and problem list.     Past Medical History:   Diagnosis Date    Atrial tachycardia     BPH (benign prostatic hyperplasia)     COPD (chronic obstructive pulmonary disease) ([facility])     Hypertension     Osteoarthritis     Peripheral vascular disease (HCC)     stenting b/l iliac stenting 2019    Severe epistaxis        Past Surgical History:   Procedure Laterality Date    CAUTERIZE INNER NOSE Bilateral 2022    Procedure: CAUTERIZATION NASAL /SEPTAL;  Surgeon: Maximiliano Faulkner MD;  Location:  MAIN OR;  Service: ENT    HERNIA REPAIR      ILIAC VEIN ANGIOPLASTY / STENTING Bilateral 2019    Dr. Miller at [facility].    NOSE SURGERY      arterial clip due to epistaxis    SKIN CANCER EXCISION      VASECTOMY         Family History   Problem Relation Age of Onset    Lymphoma Mother     Colon cancer Father     Heart attack Brother     Heart attack Paternal Grandfather          59    Heart attack Brother     Substance Abuse Brother          Medications have been verified.        Objective   BP (!) 182/91   Pulse 94   Temp (!) 97.2 °F (36.2 °C)   Resp 20   Ht 6' (1.829 m)   Wt 61.1 kg (134 lb 12.8 oz)    SpO2 92%   BMI 18.28 kg/m²   No LMP for male patient.       Physical Exam     Physical Exam  Vitals and nursing note reviewed.   Constitutional:       General: He is not in acute distress.     Appearance: He is not toxic-appearing.   HENT:      Head: Normocephalic.      Right Ear: Tympanic membrane, ear canal and external ear normal.      Left Ear: Tympanic membrane, ear canal and external ear normal.      Nose: Congestion present.      Mouth/Throat:      Mouth: Mucous membranes are moist.      Pharynx: Posterior oropharyngeal erythema present.   Eyes:      Conjunctiva/sclera: Conjunctivae normal.   Cardiovascular:      Rate and Rhythm: Normal rate and regular rhythm.      Heart sounds: Normal heart sounds.   Pulmonary:      Effort: Pulmonary effort is normal. No respiratory distress.      Breath sounds: Normal breath sounds. No stridor. No wheezing, rhonchi or rales.   Lymphadenopathy:      Cervical: No cervical adenopathy.   Skin:     General: Skin is warm and dry.   Neurological:      Mental Status: He is alert and oriented to person, place, and time.      Gait: Gait is intact.   Psychiatric:         Mood and Affect: Mood normal.         Behavior: Behavior normal.

## 2023-12-28 NOTE — PATIENT INSTRUCTIONS
Preliminary chest XR read negative, final read pending  Take antibiotic as prescribed  Take oral steroids as prescribed and continue using inhaler as needed  Continue with supportive measures, OTC Tylenol/Ibuprofen, nasal decongestants, and cough suppressants (Tessalon Perles)   Cool mist humidifiers, throat lozenges, increased fluid intake and rest   Follow up with PCP in 3-5 days  Present to ER if symptoms worsen     Acute Bronchitis   AMBULATORY CARE:   Acute bronchitis  is swelling and irritation in your lungs. It is usually caused by a virus and most often happens in the winter. Bronchitis may also be caused by bacteria or by a chemical irritant, such as smoke.  Common symptoms:   Cough that lasts up to 3 weeks    Runny or stuffy nose    Hoarseness, sore throat    Fever    Feeling more tired than usual, and body aches    Wheezing or pain when you breathe or cough    Seek care immediately if:   You cough up blood.    Your lips or fingernails turn blue.    You feel like you are not getting enough air when you breathe.    Call your doctor if:   Your symptoms do not go away or get worse, even after treatment.    Your cough does not get better within 4 weeks.    You have questions or concerns about your condition or care.    Medicines:  You may need any of the following:  Cough suppressants  decrease your urge to cough.    Decongestants  help loosen mucus in your lungs and make it easier to cough up. This can help you breathe easier.    Inhalers  may be given. Your healthcare provider may give you one or more inhalers to help you breathe easier and cough less. An inhaler gives you medicine to open your airways. Ask your healthcare provider to show you how to use your inhaler correctly.         Antiviral medicine  treats infections caused by a virus.    Antibiotics  may be given if your bronchitis is caused by bacteria or if you have lung condition.    Acetaminophen  decreases pain and fever. It is available  without a doctor's order. Ask how much to take and how often to take it. Follow directions. Read the labels of all other medicines you are using to see if they also contain acetaminophen, or ask your doctor or pharmacist. Acetaminophen can cause liver damage if not taken correctly.    NSAIDs  help decrease swelling and pain or fever. This medicine is available with or without a doctor's order. NSAIDs can cause stomach bleeding or kidney problems in certain people. If you take blood thinner medicine, always ask your healthcare provider if NSAIDs are safe for you. Always read the medicine label and follow directions.    Self-care:   Drink liquids as directed.  You may need to drink more liquids than usual to stay hydrated. Ask how much liquid to drink each day and which liquids are best for you.    Use a cool mist humidifier.  This increases air moisture in your home. This may make it easier for you to breathe and help decrease your cough.     Get more rest.  Rest helps your body to heal. Slowly start to do more each day. Rest when you feel it is needed.    Prevent acute bronchitis:       Ask about vaccines you may need.  Get a flu vaccine each year as soon as recommended, usually in September or October. Ask your healthcare provider if you should also get a pneumonia or COVID-19 vaccine. Your healthcare provider can tell you if you should also get other vaccines, and when to get them.    Prevent the spread of germs.  You can decrease your risk for acute bronchitis and other illnesses by doing the following:    Wash your hands often with soap and water. Carry germ-killing hand lotion or gel with you. You can use the lotion or gel to clean your hands when soap and water are not available.         Do not touch your eyes, nose, or mouth unless you have washed your hands first.    Always cover your mouth when you cough to prevent the spread of germs. It is best to cough into a tissue or your shirt sleeve instead of into  your hand. Ask those around you to cover their mouths when they cough.    Try to avoid people who have a cold or the flu. If you are sick, stay away from others as much as possible.    Avoid irritants in the air.  Avoid chemicals, fumes, and dust. Wear a face mask if you must work around dust or fumes. Stay inside on days when air pollution levels are high. If you have allergies, stay inside when pollen counts are high. Do not use aerosol products, such as spray-on deodorant, bug spray, and hair spray.    Do not smoke or be around others who are smoking.  Nicotine and other chemicals in cigarettes and cigars can cause lung damage. Ask your healthcare provider for information if you currently smoke and need help to quit. E-cigarettes or smokeless tobacco still contain nicotine. Talk to your healthcare provider before you use these products.  Follow up with your doctor as directed:  Write down questions you have so you will remember to ask them during your follow-up visits.  © Copyright [employer] 2023 Information is for End User's use only and may not be sold, redistributed or otherwise used for commercial purposes.  The above information is an  only. It is not intended as medical advice for individual conditions or treatments. Talk to your doctor, nurse or pharmacist before following any medical regimen to see if it is safe and effective for you.     Detail Level: Zone Detail Level: Detailed Detail Level: Generalized

## 2024-04-02 NOTE — PROGRESS NOTES
Pulmonary Consultation   Bigg Guzman 57 y o  male MRN: [id number]  1/5/2023    Assessment:    Tobacco abuse  He continues to smoke about a pack per day  We will need to continue to address this in follow up to help improve his current condition  History of COPD  It is fair to presume he has this diagnosis based on the severe hyperinflation seen on his plain chest x-ray  · Sample of Stiolto provided, demonstrated its use in the office  · Discontinue Advair  · Will write for Rose Abreu given his positive therapeutic response to sildenafil and chronic bronchitic phenotype  · Check alpha-1 antitrypsin level  · Check full PFTs  Patient declined lung cancer screening    Plan:    Diagnoses and all orders for this visit:    Tobacco abuse    History of COPD  -     Alpha-1-antitrypsin; Future  -     Complete PFT with post bronchodilator; Future  -     roflumilast (Daliresp) 500 mcg tablet; Take 1 tablet (500 mcg total) by mouth daily      Return in about 6 months (around 7/5/2023)  History of Present Illness   HPI:  Bigg Guzman is a 57 y o  male who presents for evaluation of COPD  He reports having this presumed diagnosis based on the appearance of his imaging previously, which has shown hyperinflation  His primary complaint is mucus production  He feels that after he is able to clear mucus in the mornings, he has no limitations to his breathing with the rest of the day  He does not clearly have seasonal variation to his phlegm production or diurnal variation, though it is most common to have issues first thing in the morning  He reports no history of exacerbations requiring hospitalization, though he does note that he has had pneumonia 5 times in the past 13 years  He is an active smoker of 1 pack/day, down from 2 packs/day, which he primarily did over the 53 years that he has been a smoker for    He has worked occupations involving small particles and chemicals, 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 without adequate respiratory protection  He has been prescribed Advair and albuterol, neither of which have provided any benefit  He was given a nebulizer at [facility] and found known benefit from nebulizer based therapy either  He does take a supplement called Oswald lung complex which she does feel has been helpful  He also notes that he was prescribed Viagra and felt that that really improved his breathing as well when he would take it  Review of Systems   Respiratory: Positive for cough and shortness of breath  All other systems reviewed and are negative  Historical Information   Past Medical History:   Diagnosis Date   • Atrial tachycardia ([facility] [address] )    • BPH (benign prostatic hyperplasia)    • COPD (chronic obstructive pulmonary disease) ([facility])    • Hypertension    • Osteoarthritis    • Peripheral vascular disease (HCC)     stenting b/l iliac stenting 2019   • Severe epistaxis      Past Surgical History:   Procedure Laterality Date   • CAUTERIZE INNER NOSE Bilateral 2022    Procedure: CAUTERIZATION NASAL Pilar Gabriela;  Surgeon: Antonio Burns MD;  Location: [facility] OR;  Service: ENT   • HERNIA REPAIR     • ILIAC VEIN ANGIOPLASTY / STENTING Bilateral 2019    Dr Souleymane Gonzalez at [address]     • NOSE SURGERY      arterial clip due to epistaxis   • SKIN CANCER EXCISION     • VASECTOMY       Family History   Problem Relation Age of Onset   • Lymphoma Mother    • Colon cancer Father    • Heart attack Brother    • Heart attack Paternal Grandfather          61   • Heart attack Brother    • Substance Abuse Brother        Meds/Allergies     Current Outpatient Medications:   •  lisinopril (ZESTRIL) 30 mg tablet, Take 30 mg by mouth daily, Disp: , Rfl:   •  multivitamin (THERAGRAN) TABS, Take 1 tablet by mouth daily, Disp: , Rfl:   •  roflumilast (Daliresp) 500 mcg tablet, Take 1 tablet (500 mcg total) by mouth daily, Disp: 30 tablet, Rfl: 5  No Known Allergies    Vitals: Blood pressure (!) 175/83, pulse 83, temperature (!) 96 9 °F (36 1 °C), temperature source Tympanic, height 5' 11" (1 803 m), weight 60 kg (132 lb 3 2 oz), SpO2 95 %  Body mass index is 18 44 kg/m²  Oxygen Therapy  SpO2: 95 %  Oxygen Therapy: None (Room air)    Physical Exam  Physical Exam  Vitals reviewed  Constitutional:       General: He is not in acute distress  Appearance: Normal appearance  He is well-developed  He is not ill-appearing  HENT:      Head: Normocephalic and atraumatic  Eyes:      General: No scleral icterus  Conjunctiva/sclera: Conjunctivae normal    Neck:      Thyroid: No thyromegaly  Vascular: No JVD  Cardiovascular:      Rate and Rhythm: Normal rate and regular rhythm  Heart sounds: Normal heart sounds  No murmur heard  No friction rub  No gallop  Pulmonary:      Effort: Pulmonary effort is normal  No respiratory distress  Breath sounds: Rhonchi present  No wheezing or rales  Musculoskeletal:      Cervical back: Neck supple  Right lower leg: No edema  Left lower leg: No edema  Skin:     General: Skin is warm and dry  Findings: No rash  Neurological:      General: No focal deficit present  Mental Status: He is alert and oriented to person, place, and time  Mental status is at baseline  Psychiatric:         Mood and Affect: Mood normal          Behavior: Behavior normal      Labs: I have personally reviewed pertinent lab results    Lab Results   Component Value Date    WBC 4 90 06/12/2022    HGB 12 3 06/12/2022    HCT 34 4 (L) 06/12/2022    MCV 94 06/12/2022     06/12/2022     Lab Results   Component Value Date    CALCIUM 8 0 (L) 06/12/2022    K 4 1 06/12/2022    CO2 24 06/12/2022    CL 91 (L) 06/12/2022    BUN 8 06/12/2022    CREATININE 0 72 06/12/2022     No results found for: IGE  No results found for: ALT, AST, GGT, ALKPHOS, BILITOT    Imaging and other studies: I have personally reviewed relevant images in PACS  EKG, Pathology, and Other Studies: I have personally reviewed relevant reports in [facility] Unit RN to OR RN Unit RN to OR RN

## 2024-04-29 NOTE — ASSESSMENT & PLAN NOTE
GLUCOSE OF 46   · Daily tobacco abuse x 52 years  · Declines nicotine patch  · Decline cessation counseling

## 2024-10-02 ENCOUNTER — OFFICE VISIT (OUTPATIENT)
Dept: URGENT CARE | Facility: CLINIC | Age: 64
End: 2024-10-02
Payer: MEDICARE

## 2024-10-02 VITALS
TEMPERATURE: 97.2 F | HEART RATE: 86 BPM | BODY MASS INDEX: 18.42 KG/M2 | OXYGEN SATURATION: 97 % | WEIGHT: 136 LBS | SYSTOLIC BLOOD PRESSURE: 143 MMHG | DIASTOLIC BLOOD PRESSURE: 80 MMHG | RESPIRATION RATE: 20 BRPM | HEIGHT: 72 IN

## 2024-10-02 DIAGNOSIS — S51.811A LACERATION OF RIGHT FOREARM, INITIAL ENCOUNTER: Primary | ICD-10-CM

## 2024-10-02 PROCEDURE — 99213 OFFICE O/P EST LOW 20 MIN: CPT | Performed by: NURSE PRACTITIONER

## 2024-10-02 PROCEDURE — G0463 HOSPITAL OUTPT CLINIC VISIT: HCPCS | Performed by: NURSE PRACTITIONER

## 2024-10-02 RX ORDER — SULFAMETHOXAZOLE/TRIMETHOPRIM 800-160 MG
1 TABLET ORAL EVERY 12 HOURS SCHEDULED
Qty: 20 TABLET | Refills: 0 | Status: SHIPPED | OUTPATIENT
Start: 2024-10-02 | End: 2024-10-12

## 2024-10-02 RX ORDER — AMLODIPINE BESYLATE 5 MG/1
TABLET ORAL
COMMUNITY
Start: 2024-09-20

## 2024-10-02 NOTE — PROGRESS NOTES
[facility]        NAME: Mihir Aldrich is a 64 y.o. male  : 1960    MRN: [id number]  DATE: 2024  TIME: 1:23 PM    Assessment and Plan   Laceration of right forearm, initial encounter [S51.811A]  1. Laceration of right forearm, initial encounter  sulfamethoxazole-trimethoprim (BACTRIM DS) 800-160 mg per tablet            Patient Instructions       Wound care instructions  Change dressing BID  Ok to use bacitracin ointment BID  Take med as prescribed  Wound care at the clinic  Follow up with PCP in 3-5 days.  Proceed to  ER if symptoms worsen.    If tests have been performed at [facility], our office will contact you with results if changes need to be made to the care plan discussed with you at the visit.  You can review your full results on [facility].    Chief Complaint     Chief Complaint   Patient presents with    Arm Pain     Right arm pain starting Monday, after 500lb heffer stepped on right forearm. Using triple antibiotic ointment and dressing as needed. Yesterday worsened with swelling and pain. Took 500mg penicillin at 10am today which helped with the pain. Last tetanus unknown.         History of Present Illness       HPI  Reports right arm pain. A cow stepped on the forearm. This was 2 days ago. Says sustained a wound and has been leaving the wound open. Pain increased significantly today and he took 1 tab of leftover antibiotics (amox) which helped slightly with the pain. He declines tetanus booster.     Review of Systems   Review of Systems   Constitutional:  Negative for fever.   Musculoskeletal:  Negative for back pain and joint swelling.   Skin:  Positive for color change (red) and wound (right arm).   Neurological:  Negative for light-headedness and headaches.         Current Medications       Current Outpatient Medications:     amLODIPine (NORVASC) 5 mg tablet, , Disp: , Rfl:     AMOXICILLIN PO, Take by mouth, Disp: , Rfl:     multivitamin (THERAGRAN) TABS,  Take 1 tablet by mouth daily, Disp: , Rfl:     NON FORMULARY, SHREYAS lung complex, Disp: , Rfl:     sulfamethoxazole-trimethoprim (BACTRIM DS) 800-160 mg per tablet, Take 1 tablet by mouth every 12 (twelve) hours for 10 days, Disp: 20 tablet, Rfl: 0    tiotropium-olodaterol (Stiolto Respimat) 2.5-2.5 MCG/ACT inhaler, Inhale 2 puffs daily, Disp: 4 g, Rfl: 5    albuterol (PROVENTIL HFA,VENTOLIN HFA) 90 mcg/act inhaler, Inhale 2 puffs every 6 (six) hours as needed for wheezing (Patient not taking: Reported on 10/2/2024), Disp: , Rfl:     lisinopril (ZESTRIL) 30 mg tablet, Take 40 mg by mouth daily (Patient not taking: Reported on 10/2/2024), Disp: , Rfl:     roflumilast (Daliresp) 500 mcg tablet, Take 1 tablet (500 mcg total) by mouth daily, Disp: 30 tablet, Rfl: 5    Current Allergies     Allergies as of 10/02/2024    (No Known Allergies)            The following portions of the patient's history were reviewed and updated as appropriate: allergies, current medications, past family history, past medical history, past social history, past surgical history and problem list.     Past Medical History:   Diagnosis Date    Atrial tachycardia (HCC)     BPH (benign prostatic hyperplasia)     COPD (chronic obstructive pulmonary disease) (HCC)     Hypertension     Osteoarthritis     Peripheral vascular disease (HCC)     stenting b/l iliac stenting 8/12/2019    Severe epistaxis        Past Surgical History:   Procedure Laterality Date    CAUTERIZE INNER NOSE Bilateral 6/12/2022    Procedure: CAUTERIZATION NASAL /SEPTAL;  Surgeon: Maximiliano Faulkner MD;  Location:  MAIN OR;  Service: ENT    HERNIA REPAIR      ILIAC VEIN ANGIOPLASTY / STENTING Bilateral 08/2019    Dr. Miller at [facility].    NOSE SURGERY      arterial clip due to epistaxis    SKIN CANCER EXCISION      VASECTOMY         Family History   Problem Relation Age of Onset    Lymphoma Mother     Colon cancer Father     Heart attack Brother     Heart attack Paternal Grandfather           59    Heart attack Brother     Substance Abuse Brother          Medications have been verified.        Objective   /80   Pulse 86   Temp (!) 97.2 °F (36.2 °C)   Resp 20   Ht 6' (1.829 m)   Wt 61.7 kg (136 lb)   SpO2 97%   BMI 18.44 kg/m²   No LMP for male patient.       Physical Exam     Physical Exam  Musculoskeletal:         General: Swelling (mild) and tenderness present.   Skin:     Findings: Erythema and lesion (there is a 4 cm by 3 cm open wound on the right forearm. Covered with pus. surrounding skin is erythematous. Tender to palpation) present. No bruising.     Used some gauze and wound cleansing solution to remove a lot of the pus from the wound bed. Dabbed with betadine. Covered with gauzed and taped.

## 2024-12-01 ENCOUNTER — APPOINTMENT (EMERGENCY)
Dept: RADIOLOGY | Facility: HOSPITAL | Age: 64
DRG: 871 | End: 2024-12-01
Payer: MEDICARE

## 2024-12-01 ENCOUNTER — APPOINTMENT (INPATIENT)
Dept: RADIOLOGY | Facility: HOSPITAL | Age: 64
DRG: 871 | End: 2024-12-01
Payer: MEDICARE

## 2024-12-01 ENCOUNTER — HOSPITAL ENCOUNTER (INPATIENT)
Facility: HOSPITAL | Age: 64
LOS: 2 days | Discharge: LEFT BEFORE DISCHARGE | DRG: 871 | End: 2024-12-03
Attending: EMERGENCY MEDICINE | Admitting: FAMILY MEDICINE
Payer: MEDICARE

## 2024-12-01 ENCOUNTER — APPOINTMENT (EMERGENCY)
Dept: CT IMAGING | Facility: HOSPITAL | Age: 64
DRG: 871 | End: 2024-12-01
Payer: MEDICARE

## 2024-12-01 DIAGNOSIS — J96.01 ACUTE RESPIRATORY FAILURE WITH HYPOXIA (HCC): ICD-10-CM

## 2024-12-01 DIAGNOSIS — J44.1 COPD EXACERBATION (HCC): ICD-10-CM

## 2024-12-01 DIAGNOSIS — J10.1 INFLUENZA A: ICD-10-CM

## 2024-12-01 DIAGNOSIS — F10.10 ALCOHOL ABUSE: ICD-10-CM

## 2024-12-01 DIAGNOSIS — I10 UNCONTROLLED HYPERTENSION: ICD-10-CM

## 2024-12-01 DIAGNOSIS — R09.02 HYPOXIA: Primary | ICD-10-CM

## 2024-12-01 DIAGNOSIS — J18.9 PNEUMONIA: ICD-10-CM

## 2024-12-01 DIAGNOSIS — J44.9 CHRONIC OBSTRUCTIVE PULMONARY DISEASE, UNSPECIFIED COPD TYPE (HCC): ICD-10-CM

## 2024-12-01 DIAGNOSIS — A41.9 SEPSIS, DUE TO UNSPECIFIED ORGANISM, UNSPECIFIED WHETHER ACUTE ORGAN DYSFUNCTION PRESENT (HCC): ICD-10-CM

## 2024-12-01 DIAGNOSIS — E87.1 HYPONATREMIA: ICD-10-CM

## 2024-12-01 PROBLEM — I77.810 ECTATIC THORACIC AORTA (HCC): Status: ACTIVE | Noted: 2024-12-01

## 2024-12-01 PROBLEM — R91.1 PULMONARY NODULE, RIGHT: Status: ACTIVE | Noted: 2024-12-01

## 2024-12-01 LAB
ALBUMIN SERPL BCG-MCNC: 4.5 G/DL (ref 3.5–5)
ALP SERPL-CCNC: 99 U/L (ref 34–104)
ALT SERPL W P-5'-P-CCNC: 43 U/L (ref 7–52)
ANION GAP SERPL CALCULATED.3IONS-SCNC: 10 MMOL/L (ref 4–13)
ANION GAP SERPL CALCULATED.3IONS-SCNC: 8 MMOL/L (ref 4–13)
ANION GAP SERPL CALCULATED.3IONS-SCNC: 9 MMOL/L (ref 4–13)
ANION GAP SERPL CALCULATED.3IONS-SCNC: 9 MMOL/L (ref 4–13)
APTT PPP: 29 SECONDS (ref 23–34)
AST SERPL W P-5'-P-CCNC: 61 U/L (ref 13–39)
BASOPHILS # BLD AUTO: 0.03 THOUSANDS/ΜL (ref 0–0.1)
BASOPHILS NFR BLD AUTO: 0 % (ref 0–1)
BILIRUB SERPL-MCNC: 0.44 MG/DL (ref 0.2–1)
BNP SERPL-MCNC: 66 PG/ML (ref 0–100)
BUN SERPL-MCNC: 12 MG/DL (ref 5–25)
BUN SERPL-MCNC: 13 MG/DL (ref 5–25)
BUN SERPL-MCNC: 14 MG/DL (ref 5–25)
BUN SERPL-MCNC: 15 MG/DL (ref 5–25)
CALCIUM SERPL-MCNC: 7.9 MG/DL (ref 8.4–10.2)
CALCIUM SERPL-MCNC: 8.2 MG/DL (ref 8.4–10.2)
CALCIUM SERPL-MCNC: 8.4 MG/DL (ref 8.4–10.2)
CALCIUM SERPL-MCNC: 9.5 MG/DL (ref 8.4–10.2)
CARDIAC TROPONIN I PNL SERPL HS: 12 NG/L (ref ?–50)
CHLORIDE SERPL-SCNC: 89 MMOL/L (ref 96–108)
CHLORIDE SERPL-SCNC: 96 MMOL/L (ref 96–108)
CHLORIDE SERPL-SCNC: 97 MMOL/L (ref 96–108)
CHLORIDE SERPL-SCNC: 97 MMOL/L (ref 96–108)
CO2 SERPL-SCNC: 23 MMOL/L (ref 21–32)
CO2 SERPL-SCNC: 24 MMOL/L (ref 21–32)
CO2 SERPL-SCNC: 25 MMOL/L (ref 21–32)
CO2 SERPL-SCNC: 29 MMOL/L (ref 21–32)
CREAT SERPL-MCNC: 0.81 MG/DL (ref 0.6–1.3)
CREAT SERPL-MCNC: 0.81 MG/DL (ref 0.6–1.3)
CREAT SERPL-MCNC: 0.85 MG/DL (ref 0.6–1.3)
CREAT SERPL-MCNC: 0.97 MG/DL (ref 0.6–1.3)
CREAT UR-MCNC: 20.7 MG/DL
CREAT UR-MCNC: 21.5 MG/DL
D DIMER PPP FEU-MCNC: 0.73 UG/ML FEU
EOSINOPHIL # BLD AUTO: 0.01 THOUSAND/ΜL (ref 0–0.61)
EOSINOPHIL NFR BLD AUTO: 0 % (ref 0–6)
ERYTHROCYTE [DISTWIDTH] IN BLOOD BY AUTOMATED COUNT: 12.9 % (ref 11.6–15.1)
FLUAV AG UPPER RESP QL IA.RAPID: POSITIVE
FLUBV AG UPPER RESP QL IA.RAPID: NEGATIVE
GFR SERPL CREATININE-BSD FRML MDRD: 81 ML/MIN/1.73SQ M
GFR SERPL CREATININE-BSD FRML MDRD: 90 ML/MIN/1.73SQ M
GFR SERPL CREATININE-BSD FRML MDRD: 92 ML/MIN/1.73SQ M
GFR SERPL CREATININE-BSD FRML MDRD: 92 ML/MIN/1.73SQ M
GLUCOSE SERPL-MCNC: 119 MG/DL (ref 65–140)
GLUCOSE SERPL-MCNC: 125 MG/DL (ref 65–140)
GLUCOSE SERPL-MCNC: 149 MG/DL (ref 65–140)
GLUCOSE SERPL-MCNC: 178 MG/DL (ref 65–140)
HCT VFR BLD AUTO: 48 % (ref 36.5–49.3)
HGB BLD-MCNC: 17.2 G/DL (ref 12–17)
IMM GRANULOCYTES # BLD AUTO: 0.05 THOUSAND/UL (ref 0–0.2)
IMM GRANULOCYTES NFR BLD AUTO: 1 % (ref 0–2)
INR PPP: 0.91 (ref 0.85–1.19)
L PNEUMO1 AG UR QL IA.RAPID: NEGATIVE
LACTATE SERPL-SCNC: 1.5 MMOL/L (ref 0.5–2)
LACTATE SERPL-SCNC: 2.1 MMOL/L (ref 0.5–2)
LIPASE SERPL-CCNC: 34 U/L (ref 11–82)
LYMPHOCYTES # BLD AUTO: 1.05 THOUSANDS/ΜL (ref 0.6–4.47)
LYMPHOCYTES NFR BLD AUTO: 12 % (ref 14–44)
MAGNESIUM SERPL-MCNC: 2.1 MG/DL (ref 1.9–2.7)
MCH RBC QN AUTO: 33 PG (ref 26.8–34.3)
MCHC RBC AUTO-ENTMCNC: 35.8 G/DL (ref 31.4–37.4)
MCV RBC AUTO: 92 FL (ref 82–98)
MICROALBUMIN UR-MCNC: 35.6 MG/L
MICROALBUMIN/CREAT 24H UR: 166 MG/G CREATININE (ref 0–30)
MONOCYTES # BLD AUTO: 0.77 THOUSAND/ΜL (ref 0.17–1.22)
MONOCYTES NFR BLD AUTO: 8 % (ref 4–12)
NEUTROPHILS # BLD AUTO: 7.24 THOUSANDS/ΜL (ref 1.85–7.62)
NEUTS SEG NFR BLD AUTO: 79 % (ref 43–75)
NRBC BLD AUTO-RTO: 0 /100 WBCS
OSMOLALITY UR/SERPL-RTO: 280 MMOL/KG (ref 282–298)
OSMOLALITY UR: 308 MMOL/KG (ref 250–900)
PLATELET # BLD AUTO: 307 THOUSANDS/UL (ref 149–390)
PMV BLD AUTO: 8.9 FL (ref 8.9–12.7)
POTASSIUM SERPL-SCNC: 3.2 MMOL/L (ref 3.5–5.3)
POTASSIUM SERPL-SCNC: 3.6 MMOL/L (ref 3.5–5.3)
POTASSIUM SERPL-SCNC: 3.7 MMOL/L (ref 3.5–5.3)
POTASSIUM SERPL-SCNC: 5 MMOL/L (ref 3.5–5.3)
PROCALCITONIN SERPL-MCNC: <0.05 NG/ML
PROT SERPL-MCNC: 8.4 G/DL (ref 6.4–8.4)
PROTHROMBIN TIME: 12.7 SECONDS (ref 12.3–15)
RBC # BLD AUTO: 5.22 MILLION/UL (ref 3.88–5.62)
S PNEUM AG UR QL: NEGATIVE
SARS-COV+SARS-COV-2 AG RESP QL IA.RAPID: NEGATIVE
SODIUM 24H UR-SCNC: 65 MOL/L
SODIUM SERPL-SCNC: 127 MMOL/L (ref 135–147)
SODIUM SERPL-SCNC: 128 MMOL/L (ref 135–147)
SODIUM SERPL-SCNC: 130 MMOL/L (ref 135–147)
SODIUM SERPL-SCNC: 131 MMOL/L (ref 135–147)
TSH SERPL DL<=0.05 MIU/L-ACNC: 1.37 UIU/ML (ref 0.45–4.5)
WBC # BLD AUTO: 9.15 THOUSAND/UL (ref 4.31–10.16)

## 2024-12-01 PROCEDURE — 87205 SMEAR GRAM STAIN: CPT | Performed by: FAMILY MEDICINE

## 2024-12-01 PROCEDURE — 85730 THROMBOPLASTIN TIME PARTIAL: CPT | Performed by: EMERGENCY MEDICINE

## 2024-12-01 PROCEDURE — 87804 INFLUENZA ASSAY W/OPTIC: CPT | Performed by: EMERGENCY MEDICINE

## 2024-12-01 PROCEDURE — 83605 ASSAY OF LACTIC ACID: CPT | Performed by: EMERGENCY MEDICINE

## 2024-12-01 PROCEDURE — 84300 ASSAY OF URINE SODIUM: CPT | Performed by: FAMILY MEDICINE

## 2024-12-01 PROCEDURE — 73060 X-RAY EXAM OF HUMERUS: CPT

## 2024-12-01 PROCEDURE — 94640 AIRWAY INHALATION TREATMENT: CPT

## 2024-12-01 PROCEDURE — 96365 THER/PROPH/DIAG IV INF INIT: CPT

## 2024-12-01 PROCEDURE — 84443 ASSAY THYROID STIM HORMONE: CPT | Performed by: FAMILY MEDICINE

## 2024-12-01 PROCEDURE — 84484 ASSAY OF TROPONIN QUANT: CPT | Performed by: EMERGENCY MEDICINE

## 2024-12-01 PROCEDURE — 99285 EMERGENCY DEPT VISIT HI MDM: CPT

## 2024-12-01 PROCEDURE — 87186 SC STD MICRODIL/AGAR DIL: CPT | Performed by: FAMILY MEDICINE

## 2024-12-01 PROCEDURE — 87449 NOS EACH ORGANISM AG IA: CPT | Performed by: FAMILY MEDICINE

## 2024-12-01 PROCEDURE — 83880 ASSAY OF NATRIURETIC PEPTIDE: CPT | Performed by: EMERGENCY MEDICINE

## 2024-12-01 PROCEDURE — 83935 ASSAY OF URINE OSMOLALITY: CPT | Performed by: FAMILY MEDICINE

## 2024-12-01 PROCEDURE — 83690 ASSAY OF LIPASE: CPT | Performed by: EMERGENCY MEDICINE

## 2024-12-01 PROCEDURE — 93005 ELECTROCARDIOGRAM TRACING: CPT

## 2024-12-01 PROCEDURE — 82570 ASSAY OF URINE CREATININE: CPT | Performed by: FAMILY MEDICINE

## 2024-12-01 PROCEDURE — 94760 N-INVAS EAR/PLS OXIMETRY 1: CPT

## 2024-12-01 PROCEDURE — 87070 CULTURE OTHR SPECIMN AEROBIC: CPT | Performed by: FAMILY MEDICINE

## 2024-12-01 PROCEDURE — 83735 ASSAY OF MAGNESIUM: CPT | Performed by: EMERGENCY MEDICINE

## 2024-12-01 PROCEDURE — 71275 CT ANGIOGRAPHY CHEST: CPT

## 2024-12-01 PROCEDURE — 84145 PROCALCITONIN (PCT): CPT | Performed by: FAMILY MEDICINE

## 2024-12-01 PROCEDURE — 36415 COLL VENOUS BLD VENIPUNCTURE: CPT | Performed by: EMERGENCY MEDICINE

## 2024-12-01 PROCEDURE — 71045 X-RAY EXAM CHEST 1 VIEW: CPT

## 2024-12-01 PROCEDURE — 80048 BASIC METABOLIC PNL TOTAL CA: CPT | Performed by: FAMILY MEDICINE

## 2024-12-01 PROCEDURE — 94664 DEMO&/EVAL PT USE INHALER: CPT

## 2024-12-01 PROCEDURE — 85025 COMPLETE CBC W/AUTO DIFF WBC: CPT | Performed by: EMERGENCY MEDICINE

## 2024-12-01 PROCEDURE — 80053 COMPREHEN METABOLIC PANEL: CPT | Performed by: EMERGENCY MEDICINE

## 2024-12-01 PROCEDURE — 82043 UR ALBUMIN QUANTITATIVE: CPT | Performed by: FAMILY MEDICINE

## 2024-12-01 PROCEDURE — 87077 CULTURE AEROBIC IDENTIFY: CPT | Performed by: FAMILY MEDICINE

## 2024-12-01 PROCEDURE — 85610 PROTHROMBIN TIME: CPT | Performed by: EMERGENCY MEDICINE

## 2024-12-01 PROCEDURE — 83605 ASSAY OF LACTIC ACID: CPT | Performed by: FAMILY MEDICINE

## 2024-12-01 PROCEDURE — 99285 EMERGENCY DEPT VISIT HI MDM: CPT | Performed by: EMERGENCY MEDICINE

## 2024-12-01 PROCEDURE — 83930 ASSAY OF BLOOD OSMOLALITY: CPT | Performed by: FAMILY MEDICINE

## 2024-12-01 PROCEDURE — 87811 SARS-COV-2 COVID19 W/OPTIC: CPT | Performed by: EMERGENCY MEDICINE

## 2024-12-01 PROCEDURE — 96375 TX/PRO/DX INJ NEW DRUG ADDON: CPT

## 2024-12-01 PROCEDURE — 99223 1ST HOSP IP/OBS HIGH 75: CPT | Performed by: FAMILY MEDICINE

## 2024-12-01 PROCEDURE — 96361 HYDRATE IV INFUSION ADD-ON: CPT

## 2024-12-01 PROCEDURE — 87040 BLOOD CULTURE FOR BACTERIA: CPT | Performed by: EMERGENCY MEDICINE

## 2024-12-01 PROCEDURE — 85379 FIBRIN DEGRADATION QUANT: CPT | Performed by: EMERGENCY MEDICINE

## 2024-12-01 RX ORDER — FOLIC ACID 1 MG/1
1 TABLET ORAL DAILY
Status: DISCONTINUED | OUTPATIENT
Start: 2024-12-01 | End: 2024-12-03 | Stop reason: HOSPADM

## 2024-12-01 RX ORDER — GUAIFENESIN 600 MG/1
600 TABLET, EXTENDED RELEASE ORAL EVERY 12 HOURS SCHEDULED
Status: DISCONTINUED | OUTPATIENT
Start: 2024-12-01 | End: 2024-12-03 | Stop reason: HOSPADM

## 2024-12-01 RX ORDER — SODIUM CHLORIDE, SODIUM GLUCONATE, SODIUM ACETATE, POTASSIUM CHLORIDE, MAGNESIUM CHLORIDE, SODIUM PHOSPHATE, DIBASIC, AND POTASSIUM PHOSPHATE .53; .5; .37; .037; .03; .012; .00082 G/100ML; G/100ML; G/100ML; G/100ML; G/100ML; G/100ML; G/100ML
100 INJECTION, SOLUTION INTRAVENOUS CONTINUOUS
Status: DISCONTINUED | OUTPATIENT
Start: 2024-12-01 | End: 2024-12-01

## 2024-12-01 RX ORDER — IPRATROPIUM BROMIDE AND ALBUTEROL SULFATE 2.5; .5 MG/3ML; MG/3ML
3 SOLUTION RESPIRATORY (INHALATION) ONCE
Status: COMPLETED | OUTPATIENT
Start: 2024-12-01 | End: 2024-12-01

## 2024-12-01 RX ORDER — SODIUM CHLORIDE FOR INHALATION 0.9 %
3 VIAL, NEBULIZER (ML) INHALATION
Status: DISCONTINUED | OUTPATIENT
Start: 2024-12-01 | End: 2024-12-01

## 2024-12-01 RX ORDER — SODIUM CHLORIDE 9 MG/ML
100 INJECTION, SOLUTION INTRAVENOUS CONTINUOUS
Status: DISCONTINUED | OUTPATIENT
Start: 2024-12-01 | End: 2024-12-02

## 2024-12-01 RX ORDER — ONDANSETRON 2 MG/ML
4 INJECTION INTRAMUSCULAR; INTRAVENOUS EVERY 6 HOURS PRN
Status: DISCONTINUED | OUTPATIENT
Start: 2024-12-01 | End: 2024-12-03 | Stop reason: HOSPADM

## 2024-12-01 RX ORDER — BUDESONIDE 0.5 MG/2ML
0.5 INHALANT ORAL
Status: DISCONTINUED | OUTPATIENT
Start: 2024-12-01 | End: 2024-12-03 | Stop reason: HOSPADM

## 2024-12-01 RX ORDER — LEVALBUTEROL INHALATION SOLUTION 1.25 MG/3ML
1.25 SOLUTION RESPIRATORY (INHALATION)
Status: DISCONTINUED | OUTPATIENT
Start: 2024-12-01 | End: 2024-12-03 | Stop reason: HOSPADM

## 2024-12-01 RX ORDER — NICOTINE 21 MG/24HR
1 PATCH, TRANSDERMAL 24 HOURS TRANSDERMAL DAILY
Status: DISCONTINUED | OUTPATIENT
Start: 2024-12-01 | End: 2024-12-03 | Stop reason: HOSPADM

## 2024-12-01 RX ORDER — ENOXAPARIN SODIUM 100 MG/ML
40 INJECTION SUBCUTANEOUS DAILY
Status: DISCONTINUED | OUTPATIENT
Start: 2024-12-01 | End: 2024-12-03 | Stop reason: HOSPADM

## 2024-12-01 RX ORDER — AMLODIPINE BESYLATE 5 MG/1
5 TABLET ORAL DAILY
Status: DISCONTINUED | OUTPATIENT
Start: 2024-12-01 | End: 2024-12-03 | Stop reason: HOSPADM

## 2024-12-01 RX ORDER — CEFTRIAXONE 1 G/50ML
1000 INJECTION, SOLUTION INTRAVENOUS EVERY 24 HOURS
Status: DISCONTINUED | OUTPATIENT
Start: 2024-12-02 | End: 2024-12-03 | Stop reason: HOSPADM

## 2024-12-01 RX ORDER — LANOLIN ALCOHOL/MO/W.PET/CERES
100 CREAM (GRAM) TOPICAL DAILY
Status: DISCONTINUED | OUTPATIENT
Start: 2024-12-01 | End: 2024-12-03 | Stop reason: HOSPADM

## 2024-12-01 RX ORDER — METHYLPREDNISOLONE SODIUM SUCCINATE 40 MG/ML
40 INJECTION, POWDER, LYOPHILIZED, FOR SOLUTION INTRAMUSCULAR; INTRAVENOUS EVERY 12 HOURS SCHEDULED
Status: DISCONTINUED | OUTPATIENT
Start: 2024-12-02 | End: 2024-12-03 | Stop reason: HOSPADM

## 2024-12-01 RX ORDER — METOPROLOL TARTRATE 1 MG/ML
5 INJECTION, SOLUTION INTRAVENOUS ONCE
Status: COMPLETED | OUTPATIENT
Start: 2024-12-01 | End: 2024-12-01

## 2024-12-01 RX ORDER — CEFTRIAXONE 1 G/50ML
1000 INJECTION, SOLUTION INTRAVENOUS ONCE
Status: COMPLETED | OUTPATIENT
Start: 2024-12-01 | End: 2024-12-01

## 2024-12-01 RX ORDER — ROFLUMILAST 250 UG/1
500 TABLET ORAL DAILY
Status: DISCONTINUED | OUTPATIENT
Start: 2024-12-01 | End: 2024-12-03 | Stop reason: HOSPADM

## 2024-12-01 RX ORDER — AZITHROMYCIN 250 MG/1
500 TABLET, FILM COATED ORAL EVERY 24 HOURS
Status: DISCONTINUED | OUTPATIENT
Start: 2024-12-01 | End: 2024-12-03 | Stop reason: HOSPADM

## 2024-12-01 RX ORDER — METHYLPREDNISOLONE SODIUM SUCCINATE 125 MG/2ML
125 INJECTION, POWDER, LYOPHILIZED, FOR SOLUTION INTRAMUSCULAR; INTRAVENOUS ONCE
Status: COMPLETED | OUTPATIENT
Start: 2024-12-01 | End: 2024-12-01

## 2024-12-01 RX ORDER — ACETAMINOPHEN 325 MG/1
650 TABLET ORAL EVERY 6 HOURS PRN
Status: DISCONTINUED | OUTPATIENT
Start: 2024-12-01 | End: 2024-12-03 | Stop reason: HOSPADM

## 2024-12-01 RX ADMIN — CEFTRIAXONE 1000 MG: 1 INJECTION, SOLUTION INTRAVENOUS at 13:11

## 2024-12-01 RX ADMIN — IPRATROPIUM BROMIDE AND ALBUTEROL SULFATE 3 ML: .5; 3 SOLUTION RESPIRATORY (INHALATION) at 13:12

## 2024-12-01 RX ADMIN — AZITHROMYCIN DIHYDRATE 500 MG: 250 TABLET ORAL at 15:37

## 2024-12-01 RX ADMIN — GUAIFENESIN 600 MG: 600 TABLET ORAL at 15:38

## 2024-12-01 RX ADMIN — GUAIFENESIN 600 MG: 600 TABLET ORAL at 21:05

## 2024-12-01 RX ADMIN — SODIUM CHLORIDE 2000 ML: 0.9 INJECTION, SOLUTION INTRAVENOUS at 11:20

## 2024-12-01 RX ADMIN — IOHEXOL 75 ML: 350 INJECTION, SOLUTION INTRAVENOUS at 13:04

## 2024-12-01 RX ADMIN — IPRATROPIUM BROMIDE 0.5 MG: 0.5 SOLUTION RESPIRATORY (INHALATION) at 20:00

## 2024-12-01 RX ADMIN — THIAMINE HCL TAB 100 MG 100 MG: 100 TAB at 15:38

## 2024-12-01 RX ADMIN — IPRATROPIUM BROMIDE AND ALBUTEROL SULFATE 3 ML: .5; 3 SOLUTION RESPIRATORY (INHALATION) at 11:20

## 2024-12-01 RX ADMIN — LEVALBUTEROL HYDROCHLORIDE 1.25 MG: 1.25 SOLUTION RESPIRATORY (INHALATION) at 20:00

## 2024-12-01 RX ADMIN — FOLIC ACID 1 MG: 1 TABLET ORAL at 15:38

## 2024-12-01 RX ADMIN — BUDESONIDE INHALATION 0.5 MG: 0.5 SUSPENSION RESPIRATORY (INHALATION) at 20:00

## 2024-12-01 RX ADMIN — ROFLUMILAST 500 MCG: 250 TABLET ORAL at 15:45

## 2024-12-01 RX ADMIN — SODIUM CHLORIDE 75 ML/HR: 0.9 INJECTION, SOLUTION INTRAVENOUS at 19:35

## 2024-12-01 RX ADMIN — METHYLPREDNISOLONE SODIUM SUCCINATE 125 MG: 125 INJECTION, POWDER, FOR SOLUTION INTRAMUSCULAR; INTRAVENOUS at 12:51

## 2024-12-01 RX ADMIN — METOPROLOL TARTRATE 5 MG: 1 INJECTION, SOLUTION INTRAVENOUS at 12:30

## 2024-12-01 RX ADMIN — SODIUM CHLORIDE, SODIUM GLUCONATE, SODIUM ACETATE, POTASSIUM CHLORIDE, MAGNESIUM CHLORIDE, SODIUM PHOSPHATE, DIBASIC, AND POTASSIUM PHOSPHATE 150 ML/HR: .53; .5; .37; .037; .03; .012; .00082 INJECTION, SOLUTION INTRAVENOUS at 15:36

## 2024-12-01 NOTE — PLAN OF CARE
Problem: Nutrition/Hydration-ADULT  Goal: Nutrient/Hydration intake appropriate for improving, restoring or maintaining nutritional needs  Description: Monitor and assess patient's nutrition/hydration status for malnutrition. Collaborate with interdisciplinary team and initiate plan and interventions as ordered.  Monitor patient's weight and dietary intake as ordered or per policy. Utilize nutrition screening tool and intervene as necessary. Determine patient's food preferences and provide high-protein, high-caloric foods as appropriate.     INTERVENTIONS:  - Monitor oral intake, urinary output, labs, and treatment plans  - Assess nutrition and hydration status and recommend course of action  - Evaluate amount of meals eaten  - Assist patient with eating if necessary   - Allow adequate time for meals  - Recommend/ encourage appropriate diets, oral nutritional supplements, and vitamin/mineral supplements  - Order, calculate, and assess calorie counts as needed  - Recommend, monitor, and adjust tube feedings and TPN/PPN based on assessed needs  - Assess need for intravenous fluids  - Provide specific nutrition/hydration education as appropriate  - Include patient/family/caregiver in decisions related to nutrition  Outcome: Progressing     Problem: PAIN - ADULT  Goal: Verbalizes/displays adequate comfort level or baseline comfort level  Description: Interventions:  - Encourage patient to monitor pain and request assistance  - Assess pain using appropriate pain scale  - Administer analgesics based on type and severity of pain and evaluate response  - Implement non-pharmacological measures as appropriate and evaluate response  - Consider cultural and social influences on pain and pain management  - Notify physician/advanced practitioner if interventions unsuccessful or patient reports new pain  Outcome: Progressing     Problem: INFECTION - ADULT  Goal: Absence or prevention of progression during  hospitalization  Description: INTERVENTIONS:  - Assess and monitor for signs and symptoms of infection  - Monitor lab/diagnostic results  - Monitor all insertion sites, i.e. indwelling lines, tubes, and drains  - Monitor endotracheal if appropriate and nasal secretions for changes in amount and color  - [place] appropriate cooling/warming therapies per order  - Administer medications as ordered  - Instruct and encourage patient and family to use good hand hygiene technique  - Identify and instruct in appropriate isolation precautions for identified infection/condition  Outcome: Progressing  Goal: Absence of fever/infection during neutropenic period  Description: INTERVENTIONS:  - Monitor WBC    Outcome: Progressing     Problem: SAFETY ADULT  Goal: Patient will remain free of falls  Description: INTERVENTIONS:  - Educate patient/family on patient safety including physical limitations  - Instruct patient to call for assistance with activity   - Consult OT/PT to assist with strengthening/mobility   - Keep Call bell within reach  - Keep bed low and locked with side rails adjusted as appropriate  - Keep care items and personal belongings within reach  - Initiate and maintain comfort rounds  - Make Fall Risk Sign visible to staff  - Offer Toileting every 2 Hours, in advance of need  - Initiate/Maintain bed alarm  - Obtain necessary fall risk management equipment: bed alarm   - Apply yellow socks and bracelet for high fall risk patients  - Consider moving patient to room near nurses station  Outcome: Progressing  Goal: Maintain or return to baseline ADL function  Description: INTERVENTIONS:  -  Assess patient's ability to carry out ADLs; assess patient's baseline for ADL function and identify physical deficits which impact ability to perform ADLs (bathing, care of mouth/teeth, toileting, grooming, dressing, etc.)  - Assess/evaluate cause of self-care deficits   - Assess range of motion  - Assess patient's mobility;  develop plan if impaired  - Assess patient's need for assistive devices and provide as appropriate  - Encourage maximum independence but intervene and supervise when necessary  - Involve family in performance of ADLs  - Assess for home care needs following discharge   - Consider OT consult to assist with ADL evaluation and planning for discharge  - Provide patient education as appropriate  Outcome: Progressing  Goal: Maintains/Returns to pre admission functional level  Description: INTERVENTIONS:  - Perform AM-PAC 6 Click Basic Mobility/ Daily Activity assessment daily.  - Set and communicate daily mobility goal to care team and patient/family/caregiver.   - Collaborate with rehabilitation services on mobility goals if consulted  - Out of bed for toileting  - Record patient progress and toleration of activity level   Outcome: Progressing     Problem: DISCHARGE PLANNING  Goal: Discharge to home or other facility with appropriate resources  Description: INTERVENTIONS:  - Identify barriers to discharge w/patient and caregiver  - Arrange for needed discharge resources and transportation as appropriate  - Identify discharge learning needs (meds, wound care, etc.)  - Arrange for interpretive services to assist at discharge as needed  - Refer to Case Management Department for coordinating discharge planning if the patient needs post-hospital services based on physician/advanced practitioner order or complex needs related to functional status, cognitive ability, or social support system  Outcome: Progressing     Problem: Knowledge Deficit  Goal: Patient/family/caregiver demonstrates understanding of disease process, treatment plan, medications, and discharge instructions  Description: Complete learning assessment and assess knowledge base.  Interventions:  - Provide teaching at level of understanding  - Provide teaching via preferred learning methods  Outcome: Progressing

## 2024-12-01 NOTE — ED PROVIDER NOTES
Time reflects when diagnosis was documented in both MDM as applicable and the Disposition within this note       Time User Action Codes Description Comment    12/1/2024 11:20 AM Yo Hernadez GILBERT Add [R09.02] Hypoxia     12/1/2024 12:01 PM Yo Hernadez GILBERT Add [E87.1] Hyponatremia     12/1/2024 12:33 PM Nadia Hernadezo P Add [J44.1] COPD exacerbation (HCC)     12/1/2024  1:04 PM SatyaNadiao P Add [J10.1] Influenza A     12/1/2024  1:29 PM Nadia Hernadezo P Add [J18.9] Pneumonia     12/1/2024  1:29 PM PraveenadlNadia richardsjaren HUNT Modify [J18.9] Pneumonia Right middle lobe and right lower lobe          ED Disposition       ED Disposition   Admit    Condition   Stable    Date/Time   Sun Dec 1, 2024  1:32 PM    Comment                  Assessment & Plan       Medical Decision Making  Amount and/or Complexity of Data Reviewed  Labs: ordered. Decision-making details documented in ED Course.  Radiology: ordered and independent interpretation performed. Decision-making details documented in ED Course.  ECG/medicine tests: ordered and independent interpretation performed. Decision-making details documented in ED Course.  Discussion of management or test interpretation with external provider(s): Differential diagnosis includes but not limited to STEMI, NSTEMI, PE, pneumonia, pneumothorax, musculoskeletal chest pain, costochondritis, gastritis, cholelithiasis, contusion, strain    Risk  Prescription drug management.        ED Course as of 12/01/24 1352   Sun Dec 01, 2024   1218 hs TnI 0hr: 12   1218 BNP: 66   1220 Sodium(!): 127  Drinks 6 beers a day.  States he has a history of hyponatremia.   1248 Heart rate now down to 80s with IV Lopressor.  Normal sinus rhythm with occasional PACs noted on monitor.   1304 Influenza A positive.  Symptoms started 6 days ago.  This puts the patient out of the window for starting Tamiflu.   1319 Heart rate now down to 86   1323 EKG #2 shows a normal sinus arrhythmia with a rate of 80.  No acute ST  changes noted.       Medications   sodium chloride 0.9 % bolus 2,000 mL (2,000 mL Intravenous New Bag 12/1/24 1120)   ipratropium-albuterol (DUO-NEB) 0.5-2.5 mg/3 mL inhalation solution 3 mL (3 mL Nebulization Given 12/1/24 1120)   metoprolol (LOPRESSOR) injection 5 mg (5 mg Intravenous Given 12/1/24 1230)   methylPREDNISolone sodium succinate (Solu-MEDROL) injection 125 mg (125 mg Intravenous Given 12/1/24 1251)   cefTRIAXone (ROCEPHIN) IVPB (premix in dextrose) 1,000 mg 50 mL (1,000 mg Intravenous New Bag 12/1/24 1311)   ipratropium-albuterol (DUO-NEB) 0.5-2.5 mg/3 mL inhalation solution 3 mL (3 mL Nebulization Given 12/1/24 1312)   iohexol (OMNIPAQUE) 350 MG/ML injection (MULTI-DOSE) 75 mL (75 mL Intravenous Given 12/1/24 1304)       ED Risk Strat Scores                           SBIRT 22yo+      Flowsheet Row Most Recent Value   Initial Alcohol Screen: US AUDIT-C     1. How often do you have a drink containing alcohol? 0 Filed at: 12/01/2024 1109   2. How many drinks containing alcohol do you have on a typical day you are drinking?  0 Filed at: 12/01/2024 1109   3a. Male UNDER 65: How often do you have five or more drinks on one occasion? 0 Filed at: 12/01/2024 1109   Audit-C Score 0 Filed at: 12/01/2024 1109   ZEE: How many times in the past year have you...    Used an illegal drug or used a prescription medication for non-medical reasons? Never Filed at: 12/01/2024 1109                            History of Present Illness       Chief Complaint   Patient presents with    Shortness of Breath     For the past week. Pt took left over medication from home of amoxicillian and prednisone ( 20mg BID for 5 days)       Past Medical History:   Diagnosis Date    Atrial tachycardia (HCC)     BPH (benign prostatic hyperplasia)     COPD (chronic obstructive pulmonary disease) (HCC)     Hypertension     Osteoarthritis     Peripheral vascular disease (HCC)     stenting b/l iliac stenting 8/12/2019    Severe epistaxis        Past Surgical History:   Procedure Laterality Date    CAUTERIZE INNER NOSE Bilateral 2022    Procedure: CAUTERIZATION NASAL /SEPTAL;  Surgeon: Maximiliano Faulkner MD;  Location:  MAIN OR;  Service: ENT    HERNIA REPAIR      ILIAC VEIN ANGIOPLASTY / STENTING Bilateral 2019    Dr. Miller at [facility].    NOSE SURGERY      arterial clip due to epistaxis    SKIN CANCER EXCISION      VASECTOMY        Family History   Problem Relation Age of Onset    Lymphoma Mother     Colon cancer Father     Heart attack Brother     Heart attack Paternal Grandfather          59    Heart attack Brother     Substance Abuse Brother       Social History     Tobacco Use    Smoking status: Every Day     Current packs/day: 1.00     Types: Cigarettes    Smokeless tobacco: Never   Vaping Use    Vaping status: Never Used   Substance Use Topics    Alcohol use: Yes     Comment: 5-6 beers a day    Drug use: Never      E-Cigarette/Vaping    E-Cigarette Use Never User       E-Cigarette/Vaping Substances    Nicotine No     THC No     CBD No     Flavoring No     Other No     Unknown No       I have reviewed and agree with the history as documented.     Patient with a history of COPD.  No home oxygen.  Complains of increasing shortness of breath with cough and congestion over the last 6 days.  Had leftover prednisone and amoxicillin without any relief.  Has dyspnea on exertion.  No leg swelling.  No history of CHF.  No recent travels.  No recent surgeries.  No history of PE or DVT.  Still using tobacco.  Decreased appetite.  Feels weak and lightheaded and dizzy.  Bringing up yellow phlegm.  Drinks 6 beers a day.      History provided by:  Patient   used: No    Shortness of Breath  Onset quality:  Gradual  Duration:  6 days  Timing:  Constant  Progression:  Worsening  Chronicity:  New  Context: smoke exposure and URI    Context: not animal exposure and not emotional upset    Relieved by:  Nothing  Worsened by:  Exertion  Ineffective  treatments: Prednisone and amoxicillin.  Associated symptoms: cough, fever and sputum production    Associated symptoms: no abdominal pain, no chest pain, no ear pain, no headaches, no neck pain, no rash, no sore throat, no vomiting and no wheezing        Review of Systems   Constitutional:  Positive for activity change, appetite change and fever. Negative for chills.   HENT:  Negative for ear pain, hearing loss, sore throat, trouble swallowing and voice change.    Eyes:  Negative for pain and discharge.   Respiratory:  Positive for cough, sputum production and shortness of breath. Negative for wheezing.    Cardiovascular:  Negative for chest pain and palpitations.   Gastrointestinal:  Negative for abdominal pain, blood in stool, constipation, diarrhea, nausea and vomiting.   Genitourinary:  Negative for dysuria, flank pain, frequency and hematuria.   Musculoskeletal:  Negative for joint swelling, neck pain and neck stiffness.   Skin:  Negative for rash and wound.   Neurological:  Positive for weakness. Negative for dizziness, seizures, syncope, facial asymmetry and headaches.   Psychiatric/Behavioral:  Negative for hallucinations, self-injury and suicidal ideas.    All other systems reviewed and are negative.          Objective       ED Triage Vitals   Temperature Pulse Blood Pressure Respirations SpO2 Patient Position - Orthostatic VS   12/01/24 1113 12/01/24 1111 12/01/24 1111 12/01/24 1111 12/01/24 1111 12/01/24 1145   98.2 °F (36.8 °C) (!) 107 (!) 245/118 20 (!) 88 % Sitting      Temp Source Heart Rate Source BP Location FiO2 (%) Pain Score    12/01/24 1113 12/01/24 1111 12/01/24 1145 -- 12/01/24 1111    Oral Monitor Left arm  No Pain      Vitals      Date and Time Temp Pulse SpO2 Resp BP Pain Score FACES Pain Rating User   12/01/24 1315 -- 81 92 % 20 167/93 -- -- AS   12/01/24 1245 -- 77 92 % 20 158/87 -- -- AS   12/01/24 1230 -- 129 93 % 22 154/85 -- -- AS   12/01/24 1215 -- 140 94 % 22 142/79 -- -- AS    12/01/24 1145 -- 133 94 % 22 173/149 -- -- AS   12/01/24 1113 98.2 °F (36.8 °C) -- -- -- -- -- -- KM   12/01/24 1111 -- 107 88 % 20 245/118 No Pain -- KM            Physical Exam  Vitals and nursing note reviewed.   Constitutional:       General: He is not in acute distress.     Appearance: He is well-developed.   HENT:      Head: Normocephalic and atraumatic.      Right Ear: External ear normal.      Left Ear: External ear normal.   Eyes:      General: No scleral icterus.        Right eye: No discharge.         Left eye: No discharge.      Extraocular Movements: Extraocular movements intact.      Conjunctiva/sclera: Conjunctivae normal.   Cardiovascular:      Rate and Rhythm: Normal rate and regular rhythm.      Heart sounds: Normal heart sounds. No murmur heard.  Pulmonary:      Effort: Tachypnea and accessory muscle usage present.      Breath sounds: No wheezing or rales.      Comments: Decreased with wheezes on the left  Abdominal:      General: Bowel sounds are normal. There is no distension.      Palpations: Abdomen is soft.      Tenderness: There is no abdominal tenderness. There is no guarding or rebound.   Musculoskeletal:         General: No deformity. Normal range of motion.      Cervical back: Normal range of motion and neck supple.   Skin:     General: Skin is warm and dry.      Findings: No rash.   Neurological:      General: No focal deficit present.      Mental Status: He is alert and oriented to person, place, and time.      Cranial Nerves: No cranial nerve deficit.   Psychiatric:         Mood and Affect: Mood normal.         Behavior: Behavior normal.         Thought Content: Thought content normal.         Judgment: Judgment normal.         Results Reviewed       Procedure Component Value Units Date/Time    D-Dimer [[id number]]  (Abnormal) Collected: 12/01/24 1125    Lab Status: Final result Specimen: Blood from Arm, Right Updated: 12/01/24 1207     D-Dimer, Quant 0.73 ug/ml FEU     Narrative:       In the evaluation for possible pulmonary embolism, in the appropriate (Well's Score of 4 or less) patient, the age adjusted d-dimer cutoff for this patient can be calculated as:    Age x 0.01 (in ug/mL) for Age-adjusted D-dimer exclusion threshold for a patient over 50 years.    HS Troponin 0hr (reflex protocol) [[id number]]  (Normal) Collected: 12/01/24 1125    Lab Status: Final result Specimen: Blood from Arm, Right Updated: 12/01/24 1203     hs TnI 0hr 12 ng/L     B-Type Natriuretic Peptide(BNP) [[id number]]  (Normal) Collected: 12/01/24 1125    Lab Status: Final result Specimen: Blood from Arm, Right Updated: 12/01/24 1202     BNP 66 pg/mL     Comprehensive metabolic panel [[id number]]  (Abnormal) Collected: 12/01/24 1125    Lab Status: Final result Specimen: Blood from Arm, Right Updated: 12/01/24 1159     Sodium 127 mmol/L      Potassium 5.0 mmol/L      Chloride 89 mmol/L      CO2 29 mmol/L      ANION GAP 9 mmol/L      BUN 13 mg/dL      Creatinine 0.97 mg/dL      Glucose 125 mg/dL      Calcium 9.5 mg/dL      AST 61 U/L      ALT 43 U/L      Alkaline Phosphatase 99 U/L      Total Protein 8.4 g/dL      Albumin 4.5 g/dL      Total Bilirubin 0.44 mg/dL      eGFR 81 ml/min/1.73sq m     Narrative:      National Kidney Disease Foundation guidelines for Chronic Kidney Disease (CKD):     Stage 1 with normal or high GFR (GFR > 90 mL/min/1.73 square meters)    Stage 2 Mild CKD (GFR = 60-89 mL/min/1.73 square meters)    Stage 3A Moderate CKD (GFR = 45-59 mL/min/1.73 square meters)    Stage 3B Moderate CKD (GFR = 30-44 mL/min/1.73 square meters)    Stage 4 Severe CKD (GFR = 15-29 mL/min/1.73 square meters)    Stage 5 End Stage CKD (GFR <15 mL/min/1.73 square meters)  Note: GFR calculation is accurate only with a steady state creatinine    Magnesium [[id number]]  (Normal) Collected: 12/01/24 1125    Lab Status: Final result Specimen: Blood from Arm, Right Updated: 12/01/24 1159     Magnesium 2.1 mg/dL     Lipase [[id number]]   (Normal) Collected: 12/01/24 1125    Lab Status: Final result Specimen: Blood from Arm, Right Updated: 12/01/24 1159     Lipase 34 u/L     Lactic acid, plasma (w/reflex if result > 2.0) [[id number]]  (Abnormal) Collected: 12/01/24 1125    Lab Status: Final result Specimen: Blood from Arm, Right Updated: 12/01/24 1159     LACTIC ACID 2.1 mmol/L     Narrative:      Result may be elevated if tourniquet was used during collection.    Lactic acid 2 Hours [[id number]]     Lab Status: No result Specimen: Blood     FLU/COVID Rapid Antigen (30 min. TAT) - Preferred screening test in ED [[id number]]  (Abnormal) Collected: 12/01/24 1125    Lab Status: Final result Specimen: Nares from Nose Updated: 12/01/24 1156     SARS COV Rapid Antigen Negative     Influenza A Rapid Antigen Positive     Influenza B Rapid Antigen Negative    Narrative:      This test has been performed using the [employer] Naika 2 FLU+SARS Antigen test under the Emergency Use Authorization (EUA). This test has been validated by the  and verified by the performing laboratory. The Anika uses lateral flow immunofluorescent sandwich assay to detect SARS-COV, Influenza A and Influenza B Antigen.     The [employer]idel Anika 2 SARS Antigen test does not differentiate between SARS-CoV and SARS-CoV-2.     Negative results are presumptive and may be confirmed with a molecular assay, if necessary, for patient management. Negative results do not rule out SARS-CoV-2 or influenza infection and should not be used as the sole basis for treatment or patient management decisions. A negative test result may occur if the level of antigen in a sample is below the limit of detection of this test.     Positive results are indicative of the presence of viral antigens, but do not rule out bacterial infection or co-infection with other viruses.     All test results should be used as an adjunct to clinical observations and other information available to the provider.    FOR PEDIATRIC  PATIENTS - copy/paste COVID Guidelines URL to browser: [email or web address]    Protime-INR [[id number]]  (Normal) Collected: 12/01/24 1125    Lab Status: Final result Specimen: Blood from Arm, Right Updated: 12/01/24 1151     Protime 12.7 seconds      INR 0.91    Narrative:      INR Therapeutic Range    Indication                                             INR Range      Atrial Fibrillation                                               2.0-3.0  Hypercoagulable State                                    2.0.2.3  Left Ventricular Asist Device                            2.0-3.0  Mechanical Heart Valve                                  -    Aortic(with afib, MI, embolism, HF, LA enlargement,    and/or coagulopathy)                                     2.0-3.0 (2.5-3.5)     Mitral                                                             2.5-3.5  Prosthetic/Bioprosthetic Heart Valve               2.0-3.0  Venous thromboembolism (VTE: VT, PE        2.0-3.0    APTT [[id number]]  (Normal) Collected: 12/01/24 1125    Lab Status: Final result Specimen: Blood from Arm, Right Updated: 12/01/24 1151     PTT 29 seconds     Blood culture #2 [[id number]] Collected: 12/01/24 1125    Lab Status: In process Specimen: Blood from Arm, Right Updated: 12/01/24 1135    Blood culture #1 [[id number]] Collected: 12/01/24 1125    Lab Status: In process Specimen: Blood from Arm, Left Updated: 12/01/24 1135    CBC and differential [[id number]]  (Abnormal) Collected: 12/01/24 1125    Lab Status: Final result Specimen: Blood from Arm, Right Updated: 12/01/24 1134     WBC 9.15 Thousand/uL      RBC 5.22 Million/uL      Hemoglobin 17.2 g/dL      Hematocrit 48.0 %      MCV 92 fL      MCH 33.0 pg      MCHC 35.8 g/dL      RDW 12.9 %      MPV 8.9 fL      Platelets 307 Thousands/uL      nRBC 0 /100 WBCs      Segmented % 79 %      Immature Grans % 1 %      Lymphocytes % 12 %      Monocytes % 8 %       Eosinophils Relative 0 %      Basophils Relative 0 %      Absolute Neutrophils 7.24 Thousands/µL      Absolute Immature Grans 0.05 Thousand/uL      Absolute Lymphocytes 1.05 Thousands/µL      Absolute Monocytes 0.77 Thousand/µL      Eosinophils Absolute 0.01 Thousand/µL      Basophils Absolute 0.03 Thousands/µL             CTA chest pe study   Final Interpretation by Preet Carson MD (12/01 1325)      No pulmonary embolism   Trace reflux of injected contrast into the intrahepatic IVC and proximal hepatic veins can be seen with a degree of pulmonary arterial hypertension.      Groundglass nodularity demonstrated in the right middle/lower lobe as well as lingula.   Findings consistent with an infectious/inflammatory infiltrate.      4 mm right upper lobe nodule.   Borderline 3.9 cm fusiform ascending thoracic aortic ectasia.   Recommend follow-up chest CT in 12 months.      Incidental note of cortical indistinctness within the left proximal humerus.   Most likely artifactual related to motion and/or streak artifact.   Radiographs could be considered if clinically warranted.      The study was marked in EPIC for immediate notification.               Workstation performed: [id number]         XR chest 1 view portable   ED Interpretation by Yo Hernadez MD (12/01 4154)   COPD changes.  No definite infiltrate          ECG 12 Lead Documentation Only    Date/Time: 12/1/2024 11:20 AM    Performed by: Yo Hernadez MD  Authorized by: Yo Hernadez MD    ECG reviewed by me, the ED Provider: yes    Patient location:  ED  Rate:     ECG rate:  120  Rhythm:     Rhythm: sinus rhythm    Ectopy:     Ectopy: PAC    QRS:     QRS axis:  Normal      ED Medication and Procedure Management   Prior to Admission Medications   Prescriptions Last Dose Informant Patient Reported? Taking?   AMOXICILLIN PO   Yes No   Sig: Take by mouth   NON FORMULARY   Yes No   Sig: SHREYAS lung complex   albuterol (PROVENTIL HFA,VENTOLIN HFA)  90 mcg/act inhaler   Yes No   Sig: Inhale 2 puffs every 6 (six) hours as needed for wheezing   Patient not taking: Reported on 10/2/2024   amLODIPine (NORVASC) 5 mg tablet   Yes No   lisinopril (ZESTRIL) 30 mg tablet  Self Yes No   Sig: Take 40 mg by mouth daily   Patient not taking: Reported on 10/2/2024   multivitamin (THERAGRAN) TABS   Yes No   Sig: Take 1 tablet by mouth daily   roflumilast (Daliresp) 500 mcg tablet   No No   Sig: Take 1 tablet (500 mcg total) by mouth daily   tiotropium-olodaterol (Stiolto Respimat) 2.5-2.5 MCG/ACT inhaler   No No   Sig: Inhale 2 puffs daily      Facility-Administered Medications: None     Patient's Medications   Discharge Prescriptions    No medications on file     No discharge procedures on file.  ED SEPSIS DOCUMENTATION   Time reflects when diagnosis was documented in both MDM as applicable and the Disposition within this note       Time User Action Codes Description Comment    12/1/2024 11:20 AM Yo Hernadez Add [R09.02] Hypoxia     12/1/2024 12:01 PM Yo Hernadez Add [E87.1] Hyponatremia     12/1/2024 12:33 PM Yo Hernadez Add [J44.1] COPD exacerbation (HCC)     12/1/2024  1:04 PM Yo Hernadez Add [J10.1] Influenza A     12/1/2024  1:29 PM Yo Hernadez Add [J18.9] Pneumonia     12/1/2024  1:29 PM Yo Hernadez Modify [J18.9] Pneumonia Right middle lobe and right lower lobe                 Yo Hernadez MD  12/01/24 1335       Yo Hernadez MD  12/01/24 1352

## 2024-12-01 NOTE — ASSESSMENT & PLAN NOTE
Meet criteria with tachycardia, tachypnea, with source of infection of pneumonia with recent history of influenza A  Imaging shows:Groundglass nodularity demonstrated in the right middle/lower lobe as well as lingula.  PE negative.  Flu positive-symptoms started 6 days ago, unable to start Tamiflu  Status post IV hydration and IV antibiotic  Continue IV hydration, IV antibiotic  Trend lactic acid, procalcitonin, urine Legionella pneumonia, blood culture  Follow respiratory protocol

## 2024-12-01 NOTE — QUICK NOTE
Patient's repeat sodium level came 131 from 127  Patient already 2 L bolus in ER.  After came to floor, patient started Plasma-Lyte 150 mL/h per sepsis protocol--at this time, we will cut the rate to 100 mL/h to avoid rapid correction of hyponatremia.  Continue to monitor BMP every 4 hours.    Patient also remain under retention protocol.  Per patient, in no way he will allow to insert any Webster.

## 2024-12-01 NOTE — H&P
H&P - Hospitalist   Name: Mihir Aldrich 64 y.o. male I MRN: [id number]  Unit/Bed#: ED 03 I Date of Admission: 12/1/2024   Date of Service: 12/1/2024 I Hospital Day: 0     Assessment & Plan  Sepsis (HCC)  Meet criteria with tachycardia, tachypnea, with source of infection of pneumonia with recent history of influenza A  Imaging shows:Groundglass nodularity demonstrated in the right middle/lower lobe as well as lingula.  PE negative.  Flu positive-symptoms started 6 days ago, unable to start Tamiflu  Status post IV hydration and IV antibiotic  Continue IV hydration, IV antibiotic  Trend lactic acid, procalcitonin, urine Legionella pneumonia, blood culture  Follow respiratory protocol  Chronic hyponatremia  Per chart review, sodium level around 127  Patient came with sodium 127  Per patient also drinks 6 beers per day  Patient is status post 2 L of bolus per sepsis protocol-continue to monitor BMP every 4 hours to avoid rapid correction  Check TSH level, serum was osmolality, urine osmolality, urine sodium, urine creatinine  Retention protocol  Acute respiratory failure with hypoxia (HCC)  Patient is saturating 88% on room air, currently using 2 L of oxygen to maintain adequate saturation  CTA PE negative but patient has pneumonia.  Also tested positive for flu  Patient has history of COPD  Continue treatment for COPD, pneumonia  Adjust oxygen as tolerated.  Pneumonia  Patient symptoms started 6 days ago, now flu positive-imaging shows right middle/lower lobe groundglass opacity-suspect superinfection postviral infection  Continue IV antibiotic  Respiratory protocol  Follow labs  Chronic obstructive pulmonary disease with acute lower respiratory infection (HCC)  Came with acute respiratory failure with hypoxia, tested positive for flu A, also having pneumonia-all above is contributing exacerbation  Continue treatment per respiratory protocol, and a nebulizer of steroid,    Tobacco abuse  NicoDerm patch  Alcohol  abuse  Drinks 6 beers per day-last drink yesterday on 11/30/2024, about 2-3 beer  Follow CIWA protocol  Ectatic thoracic aorta (HCC)  Borderline 3.9 cm fusiform ascending thoracic aortic ectasia.   Needs outpatient follow-up.  Pulmonary nodule, right  4 mm right upper lobe nodule.   Patient is a smoker, and is outpatient follow-up  Influenza A  Influenza A positive  Symptoms started about 6 days ago, out of window to start any Tamiflu  At this time, continue conservative management  Maintain isolation      VTE Pharmacologic Prophylaxis: VTE Score: 8 High Risk (Score >/= 5) - Pharmacological DVT Prophylaxis Ordered: enoxaparin (Lovenox). Sequential Compression Devices Ordered.  Code Status: Level 1 - Full Code per patient  Discussion with family: Updated  (wife) via phone.    Anticipated Length of Stay: Patient will be admitted on an inpatient basis with an anticipated length of stay of greater than 2 midnights secondary to monitor above monitor.    History of Present Illness   Chief Complaint: Shortness of breath    Mihir Aldrich is a 64 y.o. male with a PMH of COPD, chronic hyponatremia who presents with shortness of breath.  Patient reports symptoms started about 6 days ago..  Patient reports about 6 days ago, he started feeling sick, and normally he takes his steroid and amoxicillin with good recovery but this time he was not feeling better and this morning he was coughing of junky, productive cough.  Also having mild diarrhea.  Some fatigue but denies any body ache, any significant fever.  He still smokes.    Review of Systems   Constitutional:  Positive for activity change, appetite change, chills and fatigue. Negative for diaphoresis, fever and unexpected weight change.   HENT:  Positive for congestion. Negative for sneezing, sore throat and tinnitus.    Respiratory:  Positive for apnea, cough, shortness of breath and wheezing.    Cardiovascular:  Positive for palpitations. Negative for chest  pain and leg swelling.   Gastrointestinal:  Negative for abdominal distention, abdominal pain, diarrhea, nausea and rectal pain.   Genitourinary:  Negative for difficulty urinating, dysuria and enuresis.   Musculoskeletal:  Negative for arthralgias.   Skin:  Negative for color change, pallor and rash.   Neurological:  Negative for dizziness and facial asymmetry.   Psychiatric/Behavioral:  Negative for agitation, behavioral problems, confusion and decreased concentration.    All other systems reviewed and are negative.      Historical Information   Past Medical History:   Diagnosis Date    Atrial tachycardia (HCC)     BPH (benign prostatic hyperplasia)     COPD (chronic obstructive pulmonary disease) (HCC)     Hypertension     Osteoarthritis     Peripheral vascular disease (HCC)     stenting b/l iliac stenting 2019    Severe epistaxis      Past Surgical History:   Procedure Laterality Date    CAUTERIZE INNER NOSE Bilateral 2022    Procedure: CAUTERIZATION NASAL /SEPTAL;  Surgeon: Maximiliano Faulkner MD;  Location:  MAIN OR;  Service: ENT    HERNIA REPAIR      ILIAC VEIN ANGIOPLASTY / STENTING Bilateral 2019    Dr. Miller at [facility].    NOSE SURGERY      arterial clip due to epistaxis    SKIN CANCER EXCISION      VASECTOMY       Social History     Tobacco Use    Smoking status: Every Day     Current packs/day: 1.00     Types: Cigarettes    Smokeless tobacco: Never   Vaping Use    Vaping status: Never Used   Substance and Sexual Activity    Alcohol use: Yes     Comment: 5-6 beers a day    Drug use: Never    Sexual activity: Yes     Comment: Defer     E-Cigarette/Vaping    E-Cigarette Use Never User      E-Cigarette/Vaping Substances    Nicotine No     THC No     CBD No     Flavoring No     Other No     Unknown No      Family History   Problem Relation Age of Onset    Lymphoma Mother     Colon cancer Father     Heart attack Brother     Heart attack Paternal Grandfather          59    Heart attack Brother      Substance Abuse Brother      Social History:  Marital Status: /Civil Union   Occupation: Unknown  Patient Pre-hospital Living Situation: Home  Patient Pre-hospital Level of Mobility: walks  Patient Pre-hospital Diet Restrictions: Cardiac diet    Meds/Allergies   I have reviewed home medications with patient personally.  Prior to Admission medications    Medication Sig Start Date End Date Taking? Authorizing Provider   albuterol (PROVENTIL HFA,VENTOLIN HFA) 90 mcg/act inhaler Inhale 2 puffs every 6 (six) hours as needed for wheezing  Patient not taking: Reported on 10/2/2024    Historical Provider, MD   amLODIPine (NORVASC) 5 mg tablet  9/20/24   Historical Provider, MD   AMOXICILLIN PO Take by mouth    Historical Provider, MD   multivitamin (THERAGRAN) TABS Take 1 tablet by mouth daily    Historical Provider, MD   NON FORMULARY SHREYAS lung complex    Historical Provider, MD   roflumilast (Daliresp) 500 mcg tablet Take 1 tablet (500 mcg total) by mouth daily 1/5/23   Ayden Perez MD   tiotropium-olodaterol (Stiolto Respimat) 2.5-2.5 MCG/ACT inhaler Inhale 2 puffs daily 2/17/23 10/2/24  Naga Hayes PA-C   lisinopril (ZESTRIL) 30 mg tablet Take 40 mg by mouth daily  Patient not taking: Reported on 10/2/2024  12/1/24  Historical Provider, MD     No Known Allergies    Objective :  Temp:  [98.2 °F (36.8 °C)] 98.2 °F (36.8 °C)  HR:  [] 93  BP: (142-245)/() 156/75  Resp:  [20-22] 20  SpO2:  [88 %-97 %] 94 %  O2 Device: Nasal cannula  Nasal Cannula O2 Flow Rate (L/min):  [2 L/min] 2 L/min    Physical Exam  Vitals and nursing note reviewed.   Constitutional:       Appearance: Normal appearance. He is ill-appearing. He is not diaphoretic.   HENT:      Head: Normocephalic.      Mouth/Throat:      Mouth: Mucous membranes are dry.      Pharynx: No oropharyngeal exudate or posterior oropharyngeal erythema.   Eyes:      General: No scleral icterus.        Left eye: No discharge.      Extraocular  "Movements: Extraocular movements intact.      Conjunctiva/sclera: Conjunctivae normal.      Pupils: Pupils are equal, round, and reactive to light.   Cardiovascular:      Rate and Rhythm: Tachycardia present.      Heart sounds: No murmur heard.     No friction rub. No gallop.   Pulmonary:      Breath sounds: Wheezing and rhonchi present.      Comments: Tachypneic, increased breath sound bilaterally  Abdominal:      General: Abdomen is flat. Bowel sounds are normal. There is no distension.      Palpations: There is no mass.      Tenderness: There is no abdominal tenderness.      Hernia: No hernia is present.   Musculoskeletal:      Right lower leg: No edema.      Left lower leg: No edema.   Neurological:      Mental Status: He is alert and oriented to person, place, and time.      Cranial Nerves: No cranial nerve deficit.      Sensory: No sensory deficit.      Motor: No weakness.      Coordination: Coordination normal.          Lines/Drains:            Lab Results: I have reviewed the following results:  Results from last 7 days   Lab Units 12/01/24  1125   WBC Thousand/uL 9.15   HEMOGLOBIN g/dL 17.2*   HEMATOCRIT % 48.0   PLATELETS Thousands/uL 307   SEGS PCT % 79*   LYMPHO PCT % 12*   MONO PCT % 8   EOS PCT % 0     Results from last 7 days   Lab Units 12/01/24  1125   SODIUM mmol/L 127*   POTASSIUM mmol/L 5.0   CHLORIDE mmol/L 89*   CO2 mmol/L 29   BUN mg/dL 13   CREATININE mg/dL 0.97   ANION GAP mmol/L 9   CALCIUM mg/dL 9.5   ALBUMIN g/dL 4.5   TOTAL BILIRUBIN mg/dL 0.44   ALK PHOS U/L 99   ALT U/L 43   AST U/L 61*   GLUCOSE RANDOM mg/dL 125     Results from last 7 days   Lab Units 12/01/24  1125   INR  0.91         No results found for: \"HGBA1C\"  Results from last 7 days   Lab Units 12/01/24  1125   LACTIC ACID mmol/L 2.1*       Imaging Results Review: I reviewed radiology reports from this admission including: CT chest.  Other Study Results Review: EKG was reviewed.     Administrative Statements       ** Please " Note: This note has been constructed using a voice recognition system. **

## 2024-12-01 NOTE — RESPIRATORY THERAPY NOTE
RT Protocol Note  Mihir Aldrich 64 y.o. male MRN: [id number]  Unit/Bed#: -01 Encounter: [id number]    Assessment    Principal Problem:    Sepsis (HCC)  Active Problems:    Chronic obstructive pulmonary disease with acute lower respiratory infection (HCC)    Tobacco abuse    Chronic hyponatremia    Acute respiratory failure with hypoxia (HCC)    Pneumonia    Alcohol abuse    Ectatic thoracic aorta (HCC)    Pulmonary nodule, right    Influenza A      Home Pulmonary Medications:  Stiolto Daily, PRN albuterol.       Past Medical History:   Diagnosis Date    Atrial tachycardia (HCC)     BPH (benign prostatic hyperplasia)     COPD (chronic obstructive pulmonary disease) (HCC)     Hypertension     Osteoarthritis     Peripheral vascular disease (HCC)     stenting b/l iliac stenting 8/12/2019    Severe epistaxis      Social History     Socioeconomic History    Marital status: /Civil Union     Spouse name: None    Number of children: None    Years of education: None    Highest education level: None   Occupational History    None   Tobacco Use    Smoking status: Every Day     Current packs/day: 1.00     Types: Cigarettes    Smokeless tobacco: Never   Vaping Use    Vaping status: Never Used   Substance and Sexual Activity    Alcohol use: Yes     Comment: 5-6 beers a day    Drug use: Never    Sexual activity: Yes     Comment: Defer   Other Topics Concern    None   Social History Narrative    None     Social Drivers of Health     Financial Resource Strain: Not on file   Food Insecurity: No Food Insecurity (12/1/2024)    Nursing - Inadequate Food Risk Classification     Worried About Running Out of Food in the Last Year: Not on file     Ran Out of Food in the Last Year: Not on file     Ran Out of Food in the Last Year: 1   Transportation Needs: No Transportation Needs (12/1/2024)    Nursing - Transportation Risk Classification     Lack of Transportation: Not on file     Lack of Transportation: 2   Physical  Activity: Not on file   Stress: Not on file   Social Connections: Not on file   Intimate Partner Violence: Unknown (2024)    Nursing IPS     Feels Physically and Emotionally Safe: Not on file     Physically Hurt by Someone: Not on file     Humiliated or Emotionally Abused by Someone: Not on file     Physically Hurt by Someone: 2     Hurt or Threatened by Someone: 2   Housing Stability: Unknown (2024)    Nursing: Inadequate Housing Risk Classification     Has Housing: Not on file     Worried About Losing Housing: Not on file     Unable to Get Utilities: Not on file     Unable to Pay for Housing in the Last Year: 2     Has Housin       Subjective         Objective    Physical Exam:   Assessment Type: (P) Assess only  General Appearance: (P) Alert, Awake  Respiratory Pattern: (P) Normal  Chest Assessment: (P) Chest expansion symmetrical  Bilateral Breath Sounds: (P) Expiratory wheezes, Diminished  Cough: (P) None  O2 Device: (P) 2L nc    Vitals:  Blood pressure 156/75, pulse 93, temperature 98.2 °F (36.8 °C), temperature source Oral, resp. rate 20, height 6' (1.829 m), weight 56.5 kg (124 lb 9.6 oz), SpO2 94%.          Imaging and other studies:     O2 Device: (P) 2L nc     Plan    Respiratory Plan: (P) Home Bronchodilator Patient pathway        Resp Comments: (P) pt currently admitted due to sepsis. Pt has hx of COPD and on Stiolto at home currently ordered Pulmicort, xopenex, atrovent here. No home o2 however on 2 LNC. BS diminished expiratory wheezing. Currently positive for influenza. No resp distress noted.

## 2024-12-01 NOTE — ASSESSMENT & PLAN NOTE
Came with acute respiratory failure with hypoxia, tested positive for flu A, also having pneumonia-all above is contributing exacerbation  Continue treatment per respiratory protocol, and a nebulizer of steroid,     Patient: Eduarda Lazar    Procedure(s):  Replacement Of Deep Brain Stimulator Generator/Battery, Model Activa SC, Over The Left Chest Wall - Wound Class: I-Clean    Diagnosis: Parkinson's Disease   Diagnosis Additional Information: No value filed.    Anesthesia Type:   General     Note:  Airway :Face Mask  Patient transferred to:PACU  Comments: Wheezing - Nurse started Neb - Patient stable.Handoff Report: Identifed the Patient, Identified the Reponsible Provider, Reviewed the pertinent medical history, Discussed the surgical course, Reviewed Intra-OP anesthesia mangement and issues during anesthesia, Set expectations for post-procedure period and Allowed opportunity for questions and acknowledgement of understanding      Vitals: (Last set prior to Anesthesia Care Transfer)    CRNA VITALS  3/2/2018 0957 - 3/2/2018 1038      3/2/2018             Pulse: 82    SpO2: 100 %                Electronically Signed By: Chichi Rogers MD  March 2, 2018  10:38 AM

## 2024-12-01 NOTE — ASSESSMENT & PLAN NOTE
Patient symptoms started 6 days ago, now flu positive-imaging shows right middle/lower lobe groundglass opacity-suspect superinfection postviral infection  Continue IV antibiotic  Respiratory protocol  Follow labs

## 2024-12-01 NOTE — ASSESSMENT & PLAN NOTE
Influenza A positive  Symptoms started about 6 days ago, out of window to start any Tamiflu  At this time, continue conservative management  Maintain isolation

## 2024-12-01 NOTE — ASSESSMENT & PLAN NOTE
Per chart review, sodium level around 127  Patient came with sodium 127  Per patient also drinks 6 beers per day  Patient is status post 2 L of bolus per sepsis protocol-continue to monitor BMP every 4 hours to avoid rapid correction  Check TSH level, serum was osmolality, urine osmolality, urine sodium, urine creatinine  Retention protocol

## 2024-12-01 NOTE — SEPSIS NOTE
"  Sepsis Note   Mihir Aldrich 64 y.o. male MRN: [id number]  Unit/Bed#: ED 03 Encounter: [id number]       Initial Sepsis Screening       Row Name 12/01/24 1359                Is the patient's history suggestive of a new or worsening infection? Yes (Proceed)  -MI        Suspected source of infection pneumonia  -MI        Indicate SIRS criteria Tachycardia > 90 bpm;Tachypnea > 20 resp per min  -MI        Are two or more of the above signs & symptoms of infection both present and new to the patient? Yes (Proceed)  -MI        Assess for evidence of organ dysfunction: Are any of the below criteria present within 6 hours of suspected infection and SIRS criteria that are NOT considered to be chronic conditions? Lactate > 2.0  -MI        Date of presentation of severe sepsis --        Time of presentation of severe sepsis --        Sepsis Note: Click \"NEXT\" below (NOT \"close\") to generate sepsis note based on above information. --                  User Key  (r) = Recorded By, (t) = Taken By, (c) = Cosigned By      Initials Name Provider Type    MI Ashley Powers MD Physician                        Body mass index is 18.31 kg/m².  Wt Readings from Last 1 Encounters:   12/01/24 61.2 kg (135 lb)     IBW (Ideal Body Weight): 77.6 kg    Ideal body weight: 77.6 kg (171 lb 1.2 oz)    "

## 2024-12-02 LAB
ALBUMIN SERPL BCG-MCNC: 3.8 G/DL (ref 3.5–5)
ALP SERPL-CCNC: 81 U/L (ref 34–104)
ALT SERPL W P-5'-P-CCNC: 32 U/L (ref 7–52)
ANION GAP SERPL CALCULATED.3IONS-SCNC: 10 MMOL/L (ref 4–13)
ANION GAP SERPL CALCULATED.3IONS-SCNC: 11 MMOL/L (ref 4–13)
ANION GAP SERPL CALCULATED.3IONS-SCNC: 11 MMOL/L (ref 4–13)
ANION GAP SERPL CALCULATED.3IONS-SCNC: 12 MMOL/L (ref 4–13)
ANION GAP SERPL CALCULATED.3IONS-SCNC: 8 MMOL/L (ref 4–13)
ANION GAP SERPL CALCULATED.3IONS-SCNC: 9 MMOL/L (ref 4–13)
ARTERIAL PATENCY WRIST A: YES
AST SERPL W P-5'-P-CCNC: 26 U/L (ref 13–39)
ATRIAL RATE: 153 BPM
ATRIAL RATE: 165 BPM
ATRIAL RATE: 84 BPM
BASE EXCESS BLDA CALC-SCNC: 2.5 MMOL/L
BASOPHILS # BLD MANUAL: 0 THOUSAND/UL (ref 0–0.1)
BASOPHILS NFR MAR MANUAL: 0 % (ref 0–1)
BILIRUB SERPL-MCNC: 0.3 MG/DL (ref 0.2–1)
BUN SERPL-MCNC: 13 MG/DL (ref 5–25)
BUN SERPL-MCNC: 14 MG/DL (ref 5–25)
CALCIUM SERPL-MCNC: 8.2 MG/DL (ref 8.4–10.2)
CALCIUM SERPL-MCNC: 8.5 MG/DL (ref 8.4–10.2)
CALCIUM SERPL-MCNC: 8.7 MG/DL (ref 8.4–10.2)
CALCIUM SERPL-MCNC: 8.9 MG/DL (ref 8.4–10.2)
CALCIUM SERPL-MCNC: 8.9 MG/DL (ref 8.4–10.2)
CALCIUM SERPL-MCNC: 9 MG/DL (ref 8.4–10.2)
CHLORIDE SERPL-SCNC: 96 MMOL/L (ref 96–108)
CHLORIDE SERPL-SCNC: 97 MMOL/L (ref 96–108)
CHLORIDE SERPL-SCNC: 99 MMOL/L (ref 96–108)
CHLORIDE SERPL-SCNC: 99 MMOL/L (ref 96–108)
CO2 SERPL-SCNC: 22 MMOL/L (ref 21–32)
CO2 SERPL-SCNC: 24 MMOL/L (ref 21–32)
CO2 SERPL-SCNC: 24 MMOL/L (ref 21–32)
CO2 SERPL-SCNC: 25 MMOL/L (ref 21–32)
CO2 SERPL-SCNC: 25 MMOL/L (ref 21–32)
CO2 SERPL-SCNC: 27 MMOL/L (ref 21–32)
CREAT SERPL-MCNC: 0.71 MG/DL (ref 0.6–1.3)
CREAT SERPL-MCNC: 0.76 MG/DL (ref 0.6–1.3)
CREAT SERPL-MCNC: 0.79 MG/DL (ref 0.6–1.3)
CREAT SERPL-MCNC: 0.84 MG/DL (ref 0.6–1.3)
CREAT SERPL-MCNC: 0.85 MG/DL (ref 0.6–1.3)
CREAT SERPL-MCNC: 0.86 MG/DL (ref 0.6–1.3)
EOSINOPHIL # BLD MANUAL: 0 THOUSAND/UL (ref 0–0.4)
EOSINOPHIL NFR BLD MANUAL: 0 % (ref 0–6)
ERYTHROCYTE [DISTWIDTH] IN BLOOD BY AUTOMATED COUNT: 12.9 % (ref 11.6–15.1)
GFR SERPL CREATININE-BSD FRML MDRD: 90 ML/MIN/1.73SQ M
GFR SERPL CREATININE-BSD FRML MDRD: 90 ML/MIN/1.73SQ M
GFR SERPL CREATININE-BSD FRML MDRD: 91 ML/MIN/1.73SQ M
GFR SERPL CREATININE-BSD FRML MDRD: 93 ML/MIN/1.73SQ M
GFR SERPL CREATININE-BSD FRML MDRD: 95 ML/MIN/1.73SQ M
GFR SERPL CREATININE-BSD FRML MDRD: 97 ML/MIN/1.73SQ M
GLUCOSE SERPL-MCNC: 116 MG/DL (ref 65–140)
GLUCOSE SERPL-MCNC: 117 MG/DL (ref 65–140)
GLUCOSE SERPL-MCNC: 135 MG/DL (ref 65–140)
GLUCOSE SERPL-MCNC: 135 MG/DL (ref 65–140)
GLUCOSE SERPL-MCNC: 143 MG/DL (ref 65–140)
GLUCOSE SERPL-MCNC: 149 MG/DL (ref 65–140)
GLUCOSE SERPL-MCNC: 151 MG/DL (ref 65–140)
HCO3 BLDA-SCNC: 24.3 MMOL/L (ref 22–28)
HCT VFR BLD AUTO: 43 % (ref 36.5–49.3)
HGB BLD-MCNC: 15 G/DL (ref 12–17)
LYMPHOCYTES # BLD AUTO: 1.09 THOUSAND/UL (ref 0.6–4.47)
LYMPHOCYTES # BLD AUTO: 22 % (ref 14–44)
MAGNESIUM SERPL-MCNC: 2 MG/DL (ref 1.9–2.7)
MCH RBC QN AUTO: 32.8 PG (ref 26.8–34.3)
MCHC RBC AUTO-ENTMCNC: 34.9 G/DL (ref 31.4–37.4)
MCV RBC AUTO: 94 FL (ref 82–98)
METAMYELOCYTE ABSOLUTE CT: 0.05 THOUSAND/UL (ref 0–0.1)
METAMYELOCYTES NFR BLD MANUAL: 1 % (ref 0–1)
MONOCYTES # BLD AUTO: 0.47 THOUSAND/UL (ref 0–1.22)
MONOCYTES NFR BLD: 10 % (ref 4–12)
NASAL CANNULA: 2
NEUTROPHILS # BLD MANUAL: 3.13 THOUSAND/UL (ref 1.85–7.62)
NEUTS BAND NFR BLD MANUAL: 1 % (ref 0–8)
NEUTS SEG NFR BLD AUTO: 65 % (ref 43–75)
O2 CT BLDA-SCNC: 20.5 ML/DL (ref 16–23)
OXYHGB MFR BLDA: 90.7 % (ref 94–97)
P AXIS: 87 DEGREES
P AXIS: 88 DEGREES
P AXIS: 89 DEGREES
PCO2 BLDA: 30.4 MM HG (ref 36–44)
PH BLDA: 7.52 [PH] (ref 7.35–7.45)
PLATELET # BLD AUTO: 250 THOUSANDS/UL (ref 149–390)
PLATELET BLD QL SMEAR: ADEQUATE
PMV BLD AUTO: 9 FL (ref 8.9–12.7)
PO2 BLDA: 57.5 MM HG (ref 75–129)
POTASSIUM SERPL-SCNC: 3 MMOL/L (ref 3.5–5.3)
POTASSIUM SERPL-SCNC: 3.1 MMOL/L (ref 3.5–5.3)
POTASSIUM SERPL-SCNC: 3.4 MMOL/L (ref 3.5–5.3)
POTASSIUM SERPL-SCNC: 3.5 MMOL/L (ref 3.5–5.3)
POTASSIUM SERPL-SCNC: 4 MMOL/L (ref 3.5–5.3)
POTASSIUM SERPL-SCNC: 4 MMOL/L (ref 3.5–5.3)
PR INTERVAL: 114 MS
PR INTERVAL: 126 MS
PR INTERVAL: 184 MS
PROCALCITONIN SERPL-MCNC: <0.05 NG/ML
PROT SERPL-MCNC: 6.8 G/DL (ref 6.4–8.4)
QRS AXIS: 78 DEGREES
QRS AXIS: 82 DEGREES
QRS AXIS: 84 DEGREES
QRSD INTERVAL: 58 MS
QRSD INTERVAL: 76 MS
QRSD INTERVAL: 84 MS
QT INTERVAL: 246 MS
QT INTERVAL: 260 MS
QT INTERVAL: 388 MS
QTC INTERVAL: 407 MS
QTC INTERVAL: 412 MS
QTC INTERVAL: 458 MS
RBC # BLD AUTO: 4.58 MILLION/UL (ref 3.88–5.62)
RBC MORPH BLD: NORMAL
SODIUM SERPL-SCNC: 129 MMOL/L (ref 135–147)
SODIUM SERPL-SCNC: 131 MMOL/L (ref 135–147)
SODIUM SERPL-SCNC: 132 MMOL/L (ref 135–147)
SODIUM SERPL-SCNC: 133 MMOL/L (ref 135–147)
SODIUM SERPL-SCNC: 134 MMOL/L (ref 135–147)
SODIUM SERPL-SCNC: 134 MMOL/L (ref 135–147)
SPECIMEN SOURCE: ABNORMAL
T WAVE AXIS: 79 DEGREES
T WAVE AXIS: 88 DEGREES
T WAVE AXIS: 88 DEGREES
VARIANT LYMPHS # BLD AUTO: 1 %
VENTRICULAR RATE: 151 BPM
VENTRICULAR RATE: 165 BPM
VENTRICULAR RATE: 84 BPM
WBC # BLD AUTO: 4.74 THOUSAND/UL (ref 4.31–10.16)

## 2024-12-02 PROCEDURE — 80048 BASIC METABOLIC PNL TOTAL CA: CPT | Performed by: FAMILY MEDICINE

## 2024-12-02 PROCEDURE — 93005 ELECTROCARDIOGRAM TRACING: CPT

## 2024-12-02 PROCEDURE — 94640 AIRWAY INHALATION TREATMENT: CPT

## 2024-12-02 PROCEDURE — 93010 ELECTROCARDIOGRAM REPORT: CPT | Performed by: INTERNAL MEDICINE

## 2024-12-02 PROCEDURE — 84145 PROCALCITONIN (PCT): CPT | Performed by: FAMILY MEDICINE

## 2024-12-02 PROCEDURE — 85007 BL SMEAR W/DIFF WBC COUNT: CPT | Performed by: FAMILY MEDICINE

## 2024-12-02 PROCEDURE — NC001 PR NO CHARGE: Performed by: NURSE PRACTITIONER

## 2024-12-02 PROCEDURE — 80053 COMPREHEN METABOLIC PANEL: CPT | Performed by: FAMILY MEDICINE

## 2024-12-02 PROCEDURE — 83735 ASSAY OF MAGNESIUM: CPT | Performed by: FAMILY MEDICINE

## 2024-12-02 PROCEDURE — 82805 BLOOD GASES W/O2 SATURATION: CPT | Performed by: FAMILY MEDICINE

## 2024-12-02 PROCEDURE — 85027 COMPLETE CBC AUTOMATED: CPT | Performed by: FAMILY MEDICINE

## 2024-12-02 PROCEDURE — 94760 N-INVAS EAR/PLS OXIMETRY 1: CPT

## 2024-12-02 PROCEDURE — 82948 REAGENT STRIP/BLOOD GLUCOSE: CPT

## 2024-12-02 PROCEDURE — 36600 WITHDRAWAL OF ARTERIAL BLOOD: CPT

## 2024-12-02 PROCEDURE — 99233 SBSQ HOSP IP/OBS HIGH 50: CPT | Performed by: FAMILY MEDICINE

## 2024-12-02 RX ORDER — LORAZEPAM 0.5 MG/1
0.5 TABLET ORAL ONCE
Status: COMPLETED | OUTPATIENT
Start: 2024-12-02 | End: 2024-12-02

## 2024-12-02 RX ORDER — LABETALOL HYDROCHLORIDE 5 MG/ML
10 INJECTION, SOLUTION INTRAVENOUS EVERY 4 HOURS PRN
Status: DISCONTINUED | OUTPATIENT
Start: 2024-12-02 | End: 2024-12-03 | Stop reason: HOSPADM

## 2024-12-02 RX ORDER — FUROSEMIDE 10 MG/ML
20 INJECTION INTRAMUSCULAR; INTRAVENOUS ONCE
Status: COMPLETED | OUTPATIENT
Start: 2024-12-02 | End: 2024-12-02

## 2024-12-02 RX ORDER — ECHINACEA PURPUREA EXTRACT 125 MG
1 TABLET ORAL
Status: DISCONTINUED | OUTPATIENT
Start: 2024-12-02 | End: 2024-12-03 | Stop reason: HOSPADM

## 2024-12-02 RX ORDER — IPRATROPIUM BROMIDE AND ALBUTEROL SULFATE 2.5; .5 MG/3ML; MG/3ML
3 SOLUTION RESPIRATORY (INHALATION) ONCE
Status: COMPLETED | OUTPATIENT
Start: 2024-12-02 | End: 2024-12-02

## 2024-12-02 RX ORDER — HYDRALAZINE HYDROCHLORIDE 20 MG/ML
10 INJECTION INTRAMUSCULAR; INTRAVENOUS EVERY 6 HOURS PRN
Status: DISCONTINUED | OUTPATIENT
Start: 2024-12-02 | End: 2024-12-03 | Stop reason: HOSPADM

## 2024-12-02 RX ADMIN — BUDESONIDE INHALATION 0.5 MG: 0.5 SUSPENSION RESPIRATORY (INHALATION) at 19:51

## 2024-12-02 RX ADMIN — GUAIFENESIN 600 MG: 600 TABLET ORAL at 08:27

## 2024-12-02 RX ADMIN — METHYLPREDNISOLONE SODIUM SUCCINATE 40 MG: 40 INJECTION, POWDER, FOR SOLUTION INTRAMUSCULAR; INTRAVENOUS at 08:27

## 2024-12-02 RX ADMIN — THIAMINE HCL TAB 100 MG 100 MG: 100 TAB at 08:27

## 2024-12-02 RX ADMIN — FOLIC ACID 1 MG: 1 TABLET ORAL at 08:27

## 2024-12-02 RX ADMIN — IPRATROPIUM BROMIDE 0.5 MG: 0.5 SOLUTION RESPIRATORY (INHALATION) at 19:51

## 2024-12-02 RX ADMIN — AMLODIPINE BESYLATE 5 MG: 5 TABLET ORAL at 08:27

## 2024-12-02 RX ADMIN — LEVALBUTEROL HYDROCHLORIDE 1.25 MG: 1.25 SOLUTION RESPIRATORY (INHALATION) at 14:16

## 2024-12-02 RX ADMIN — BUDESONIDE INHALATION 0.5 MG: 0.5 SUSPENSION RESPIRATORY (INHALATION) at 06:47

## 2024-12-02 RX ADMIN — HYDRALAZINE HYDROCHLORIDE 10 MG: 20 INJECTION INTRAMUSCULAR; INTRAVENOUS at 05:51

## 2024-12-02 RX ADMIN — GUAIFENESIN 600 MG: 600 TABLET ORAL at 21:05

## 2024-12-02 RX ADMIN — CEFTRIAXONE 1000 MG: 1 INJECTION, SOLUTION INTRAVENOUS at 13:38

## 2024-12-02 RX ADMIN — FUROSEMIDE 20 MG: 10 INJECTION, SOLUTION INTRAMUSCULAR; INTRAVENOUS at 15:18

## 2024-12-02 RX ADMIN — LEVALBUTEROL HYDROCHLORIDE 1.25 MG: 1.25 SOLUTION RESPIRATORY (INHALATION) at 06:47

## 2024-12-02 RX ADMIN — Medication 1 TABLET: at 08:27

## 2024-12-02 RX ADMIN — SODIUM CHLORIDE 100 ML/HR: 0.9 INJECTION, SOLUTION INTRAVENOUS at 07:36

## 2024-12-02 RX ADMIN — LORAZEPAM 0.5 MG: 0.5 TABLET ORAL at 06:24

## 2024-12-02 RX ADMIN — AZITHROMYCIN DIHYDRATE 500 MG: 250 TABLET ORAL at 13:38

## 2024-12-02 RX ADMIN — IPRATROPIUM BROMIDE 0.5 MG: 0.5 SOLUTION RESPIRATORY (INHALATION) at 06:47

## 2024-12-02 RX ADMIN — IPRATROPIUM BROMIDE AND ALBUTEROL SULFATE 3 ML: .5; 3 SOLUTION RESPIRATORY (INHALATION) at 05:49

## 2024-12-02 RX ADMIN — METHYLPREDNISOLONE SODIUM SUCCINATE 40 MG: 40 INJECTION, POWDER, FOR SOLUTION INTRAMUSCULAR; INTRAVENOUS at 21:05

## 2024-12-02 RX ADMIN — LEVALBUTEROL HYDROCHLORIDE 1.25 MG: 1.25 SOLUTION RESPIRATORY (INHALATION) at 19:51

## 2024-12-02 RX ADMIN — ROFLUMILAST 500 MCG: 250 TABLET ORAL at 10:59

## 2024-12-02 RX ADMIN — IPRATROPIUM BROMIDE 0.5 MG: 0.5 SOLUTION RESPIRATORY (INHALATION) at 14:16

## 2024-12-02 NOTE — ASSESSMENT & PLAN NOTE
Per chart review, sodium level around 127  Patient came with sodium 127  Per patient also drinks 6 beers per day  Patient is status post 2 L of bolus per sepsis protocol-continue to monitor BMP every 4 hours to avoid rapid correction  Sodium is improving

## 2024-12-02 NOTE — PLAN OF CARE
Problem: Nutrition/Hydration-ADULT  Goal: Nutrient/Hydration intake appropriate for improving, restoring or maintaining nutritional needs  Description: Monitor and assess patient's nutrition/hydration status for malnutrition. Collaborate with interdisciplinary team and initiate plan and interventions as ordered.  Monitor patient's weight and dietary intake as ordered or per policy. Utilize nutrition screening tool and intervene as necessary. Determine patient's food preferences and provide high-protein, high-caloric foods as appropriate.     INTERVENTIONS:  - Monitor oral intake, urinary output, labs, and treatment plans  - Assess nutrition and hydration status and recommend course of action  - Evaluate amount of meals eaten  - Assist patient with eating if necessary   - Allow adequate time for meals  - Recommend/ encourage appropriate diets, oral nutritional supplements, and vitamin/mineral supplements  - Order, calculate, and assess calorie counts as needed  - Recommend, monitor, and adjust tube feedings and TPN/PPN based on assessed needs  - Assess need for intravenous fluids  - Provide specific nutrition/hydration education as appropriate  - Include patient/family/caregiver in decisions related to nutrition  Outcome: Progressing     Problem: PAIN - ADULT  Goal: Verbalizes/displays adequate comfort level or baseline comfort level  Description: Interventions:  - Encourage patient to monitor pain and request assistance  - Assess pain using appropriate pain scale  - Administer analgesics based on type and severity of pain and evaluate response  - Implement non-pharmacological measures as appropriate and evaluate response  - Consider cultural and social influences on pain and pain management  - Notify physician/advanced practitioner if interventions unsuccessful or patient reports new pain  Outcome: Progressing     Problem: INFECTION - ADULT  Goal: Absence or prevention of progression during  hospitalization  Description: INTERVENTIONS:  - Assess and monitor for signs and symptoms of infection  - Monitor lab/diagnostic results  - Monitor all insertion sites, i.e. indwelling lines, tubes, and drains  - Monitor endotracheal if appropriate and nasal secretions for changes in amount and color  - [place] appropriate cooling/warming therapies per order  - Administer medications as ordered  - Instruct and encourage patient and family to use good hand hygiene technique  - Identify and instruct in appropriate isolation precautions for identified infection/condition  Outcome: Progressing  Goal: Absence of fever/infection during neutropenic period  Description: INTERVENTIONS:  - Monitor WBC    Outcome: Progressing     Problem: SAFETY ADULT  Goal: Patient will remain free of falls  Description: INTERVENTIONS:  - Educate patient/family on patient safety including physical limitations  - Instruct patient to call for assistance with activity   - Consult OT/PT to assist with strengthening/mobility   - Keep Call bell within reach  - Keep bed low and locked with side rails adjusted as appropriate  - Keep care items and personal belongings within reach  - Initiate and maintain comfort rounds  - Make Fall Risk Sign visible to staff  - Apply yellow socks and bracelet for high fall risk patients  - Consider moving patient to room near nurses station  Outcome: Progressing  Goal: Maintain or return to baseline ADL function  Description: INTERVENTIONS:  -  Assess patient's ability to carry out ADLs; assess patient's baseline for ADL function and identify physical deficits which impact ability to perform ADLs (bathing, care of mouth/teeth, toileting, grooming, dressing, etc.)  - Assess/evaluate cause of self-care deficits   - Assess range of motion  - Assess patient's mobility; develop plan if impaired  - Assess patient's need for assistive devices and provide as appropriate  - Encourage maximum independence but intervene and  supervise when necessary  - Involve family in performance of ADLs  - Assess for home care needs following discharge   - Consider OT consult to assist with ADL evaluation and planning for discharge  - Provide patient education as appropriate  Outcome: Progressing  Goal: Maintains/Returns to pre admission functional level  Description: INTERVENTIONS:  - Perform AM-PAC 6 Click Basic Mobility/ Daily Activity assessment daily.  - Set and communicate daily mobility goal to care team and patient/family/caregiver.   - Collaborate with rehabilitation services on mobility goals if consulted  - Out of bed for toileting  - Record patient progress and toleration of activity level   Outcome: Progressing     Problem: DISCHARGE PLANNING  Goal: Discharge to home or other facility with appropriate resources  Description: INTERVENTIONS:  - Identify barriers to discharge w/patient and caregiver  - Arrange for needed discharge resources and transportation as appropriate  - Identify discharge learning needs (meds, wound care, etc.)  - Arrange for interpretive services to assist at discharge as needed  - Refer to Case Management Department for coordinating discharge planning if the patient needs post-hospital services based on physician/advanced practitioner order or complex needs related to functional status, cognitive ability, or social support system  Outcome: Progressing     Problem: Knowledge Deficit  Goal: Patient/family/caregiver demonstrates understanding of disease process, treatment plan, medications, and discharge instructions  Description: Complete learning assessment and assess knowledge base.  Interventions:  - Provide teaching at level of understanding  - Provide teaching via preferred learning methods  Outcome: Progressing

## 2024-12-02 NOTE — RAPID RESPONSE
Rapid Response Note  Mihir Aldrich 64 y.o. male MRN: [id number]  Unit/Bed#: -01 Encounter: [id number]    Rapid Response Notification(s):   Response called date/time:  12/2/2024 6:35 AM  Response team arrival date/time:  12/2/2024 6:35 AM  Response end date/time:  12/2/2024 6:45 AM  Level of care:  Medsur  [facility]  Primary reason for rapid response call:  Other (comment) (elevated sbp)    Rapid Response Intervention(s):   Airway:  None  Breathing:  None  Circulation:  Electrocardiogram  Fluids administered:  None  Medications administered:  None       Assessment/plan:   HTN--PRN hydralazine/labetolol after resp status improves, EKG MAT  Acute hypoxic resp failure-stable sats mid 90 on 2L NC, wheezing on exam, give neb, t/c steroids if no improvement w copd hx         Rapid Response Outcome:   Transfer:  Remain on floor  Primary service notified of transfer: Yes (NP at bedside)    Code Status: Level 1 (Full Code)      Family notified: Primary team to notify Family Member       Background/Situation:   Mihir Aldrich is a 64 y.o. male who presents with sob found to be flu positive and treating for bacterial pna.     RRT for /100 after attempting to urinate with some mild resp distress.     Review of Systems    Objective:   Vitals:    12/02/24 0639 12/02/24 0639 12/02/24 0646 12/02/24 0648   BP:  (!) 197/125 (!) 208/105    Pulse: (!) 113 (!) 131 (!) 111    Resp:       Temp:       TempSrc:       SpO2: 90%  (!) 89% 90%   Weight:       Height:         Physical Exam

## 2024-12-02 NOTE — CASE MANAGEMENT
Case Management Assessment & Discharge Planning Note    Patient name Mihir Aldrich  Location /-01 MRN [id number]  : 1960 Date 2024       Current Admission Date: 2024  Current Admission Diagnosis:Sepsis (HCC)   Patient Active Problem List    Diagnosis Date Noted Date Diagnosed    Sepsis (HCC) 2024     Acute respiratory failure with hypoxia (HCC) 2024     Pneumonia 2024     Alcohol abuse 2024     Ectatic thoracic aorta (HCC) 2024     Pulmonary nodule, right 2024     Influenza A 2024     Tobacco abuse 2022     Moderate protein-calorie malnutrition (HCC) 2022     Chronic hyponatremia 2022     Atrial tachycardia (HCC) 2022     Epistaxis 2022     Atrial ectopy 2020     Osteoarthritis      Chronic obstructive pulmonary disease with acute lower respiratory infection (HCC)      BPH (benign prostatic hyperplasia)      Family history of coronary artery disease 2020     Uncontrolled hypertension      Precordial pain      Peripheral vascular disease (HCC)        LOS (days): 1  Geometric Mean LOS (GMLOS) (days): 4.9  Days to GMLOS:3.8     OBJECTIVE:    Risk of Unplanned Readmission Score: 12.33         Current admission status: Inpatient  Referral Reason: Other (Medications  Post Acute Home Needs (VNA/DME/Infusion))    Preferred Pharmacy:   [place], [place] - [address] [facility]  [address] [facility] [address]  Phone: [phone] Fax: [phone]    [facility] [address] [place] [address] [address] [facility]  [address] [facility] [address]  Phone: [phone] Fax: [phone]    Primary Care Provider: Koko Lopez DO    Primary Insurance: MEDICARE  Secondary Insurance: BLUE CROSS    ASSESSMENT:  Active Health Care Proxies    There are no active Health Care Proxies on file.       Advance Directives  Does patient have a Health Care POA?: No  Was patient  offered paperwork?: Yes (CM provided paperwork)  Does patient currently have a Health Care decision maker?: Yes, please see Health Care Proxy section  Does patient have Advance Directives?: No  Was patient offered paperwork?: Yes (patient was provided paperowrk)  Primary Contact: Debi Aldrich, spouse         Readmission Root Cause  30 Day Readmission: No    Patient Information  Admitted from:: Home  Mental Status: Alert  During Assessment patient was accompanied by: Spouse  Assessment information provided by:: Patient, Spouse  Primary Caregiver: Self  Support Systems: Spouse/significant other, Son  County of Residence: [facility]  What city do you live in?: [place]  Home entry access options. Select all that apply.: Stairs  Number of steps to enter home.: 2  Do the steps have railings?: No  Type of Current Residence: 2 story home  Upon entering residence, is there a bedroom on the main floor (no further steps)?: No  A bedroom is located on the following floor levels of residence (select all that apply):: 2nd Floor  Upon entering residence, is there a bathroom on the main floor (no further steps)?: Yes  Number of steps to 2nd floor from main floor: One Flight  Living Arrangements: Lives w/ Spouse/significant other  Is patient a ?: No    Activities of Daily Living Prior to Admission  Functional Status: Independent  Completes ADLs independently?: Yes  Ambulates independently?: Yes  Does patient use assisted devices?: No  Does patient currently own DME?: Yes  What DME does the patient currently own?: [employer], [employer]  Does patient have a history of Outpatient Therapy (PT/OT)?: No  Does the patient have a history of Short-Term Rehab?: No  Does patient have a history of HHC?: No  Does patient currently have HHC?: No         Patient Information Continued  Income Source: SSI/SSD  Does patient have prescription coverage?: Yes  Does patient receive dialysis treatments?: No  Does patient have a history of  substance abuse?: Yes  Historical substance use preference: Alcohol/ETOH  History of Withdrawal Symptoms: Denies past symptoms  Is patient currently in treatment for substance abuse?: No. Patient declined treatment information.  Does patient have a history of Mental Health Diagnosis?: No         Means of Transportation  Means of Transport to hospitals:: Drives Self          DISCHARGE DETAILS:    Discharge planning discussed with:: patient and spouse, Debi  Freedom of Choice: Yes     CM contacted family/caregiver?: Yes  Were Treatment Team discharge recommendations reviewed with patient/caregiver?: Yes  Did patient/caregiver verbalize understanding of patient care needs?: Yes       Contacts  Patient Contacts: kaia Zaragoza  Relationship to Patient:: Family  Contact Method: In Person  Reason/Outcome: Continuity of Care, Discharge Planning    Requested Home Health Care         Is the patient interested in HHC at discharge?: No                   Treatment Team Recommendation: Home  Discharge Destination Plan:: Home  Transport at Discharge : Family              CM met with patient and spouse, Debi, at the bedside, baseline information was obtained. CM discussed the role of CM in helping the patient develop a discharge plan and assist the patient in carry out their plan.     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 Patient independent at baseline. CM discussed with patient and spouse possibility of patient requiring DME company for home O2 upon discharge. Patient and spouse are aware we frequently utilized [employer] as we have a working relationship with this company. Patient has no preference. Patients choice is to use [employer] if required.    CM discussed with patient alcohol use. Patient reported 6 12 oz beers per day, Yadav Amilcar Mcclendon. Patient denied need for counseling referral or participating in counseling in past.     CM Provided spouse with POA paperwork and Health Care  Representative and [facility] Advanced Directive and Medical Directive Declaration paperwork, as requested.      CM to follow for CM discharge referral needs, upon discharge.

## 2024-12-02 NOTE — ASSESSMENT & PLAN NOTE
Patient is saturating 88% on room air, currently using 2 L of oxygen to maintain adequate saturation  CTA PE negative but patient has pneumonia.  Also tested positive for flu  Patient has history of COPD  Continue treatment for COPD, pneumonia  Adjust oxygen as tolerated.  ABG showing compensating respiratory alkalosis.

## 2024-12-02 NOTE — ASSESSMENT & PLAN NOTE
NicoDerm patch   Detail Level: Generalized General Sunscreen Counseling: I recommended a broad spectrum sunscreen with a SPF of 30 or higher. I explained that SPF 30 sunscreens block approximately 97 percent of the sun's harmful rays. Sunscreens should be applied at least 15 minutes prior to expected sun exposure and then every 2 hours after that as long as sun exposure continues. If swimming or exercising sunscreen should be reapplied every 45 minutes to an hour after getting wet or sweating. One ounce, or the equivalent of a shot glass full of sunscreen, is adequate to protect the skin not covered by a bathing suit. I also recommended a lip balm with a sunscreen as well. Sun protective clothing can be used in lieu of sunscreen but must be worn the entire time you are exposed to the sun's rays.

## 2024-12-02 NOTE — ASSESSMENT & PLAN NOTE
Came with acute respiratory failure with hypoxia, tested positive for flu A, also having pneumonia-all above is contributing exacerbation  Continue treatment per respiratory protocol, and a nebulizer of steroid,

## 2024-12-02 NOTE — PROGRESS NOTES
Progress Note - Hospitalist   Name: Mihir Aldrich 64 y.o. male I MRN: [id number]  Unit/Bed#: -01 I Date of Admission: 12/1/2024   Date of Service: 12/2/2024 I Hospital Day: 1    Assessment & Plan  Sepsis (HCC)  Meet criteria with tachycardia, tachypnea, with source of infection of pneumonia with recent history of influenza A  Imaging shows:Groundglass nodularity demonstrated in the right middle/lower lobe as well as lingula.  PE negative.  Flu positive-symptoms started 6 days ago, unable to start Tamiflu  Status post IV hydration and IV antibiotic  Continue IV hydration, IV antibiotic  Follow-up, blood culture  Urine Legionella pneumonia negative, procalcitonin x 2 negative  Follow respiratory protocol  Chronic hyponatremia  Per chart review, sodium level around 127  Patient came with sodium 127  Per patient also drinks 6 beers per day  Patient is status post 2 L of bolus per sepsis protocol-continue to monitor BMP every 4 hours to avoid rapid correction  Sodium is improving  Acute respiratory failure with hypoxia ([facility])  Patient is saturating 88% on room air, currently using 2 L of oxygen to maintain adequate saturation  CTA PE negative but patient has pneumonia.  Also tested positive for flu  Patient has history of COPD  Continue treatment for COPD, pneumonia  Adjust oxygen as tolerated.  ABG showing compensating respiratory alkalosis.  Pneumonia  Patient symptoms started 6 days ago, now flu positive-imaging shows right middle/lower lobe groundglass opacity-suspect superinfection postviral infection  Continue IV antibiotic  Respiratory protocol  Follow labs  Chronic obstructive pulmonary disease with acute lower respiratory infection (HCC)  Came with acute respiratory failure with hypoxia, tested positive for flu A, also having pneumonia-all above is contributing exacerbation  Continue treatment per respiratory protocol, and a nebulizer of steroid,    Tobacco abuse  NicoDerm patch  Alcohol abuse  Drinks  6 beers per day-last drink yesterday on 11/30/2024, about 2-3 beer  Follow CIWA protocol  Ectatic thoracic aorta (HCC)  Borderline 3.9 cm fusiform ascending thoracic aortic ectasia.   Needs outpatient follow-up.  Pulmonary nodule, right  4 mm right upper lobe nodule.   Patient is a smoker, and is outpatient follow-up  Influenza A  Influenza A positive  Symptoms started about 6 days ago, out of window to start any Tamiflu  At this time, continue conservative management  Maintain isolation    VTE Pharmacologic Prophylaxis: VTE Score: 8 High Risk (Score >/= 5) - Pharmacological DVT Prophylaxis Ordered: enoxaparin (Lovenox). Sequential Compression Devices Ordered.    Mobility:   Basic Mobility Inpatient Raw Score: 24  JH-HLM Goal: 8: Walk 250 feet or more  JH-HLM Achieved: 4: Move to chair/commode  JH-HLM Goal NOT achieved. Continue with multidisciplinary rounding and encourage appropriate mobility to improve upon JH-HLM goals.    Patient Centered Rounds: I performed bedside rounds with nursing staff today.   Discussions with Specialists or Other Care Team Provider: None    Education and Discussions with Family / Patient: Updated  (wife and daughter) at bedside.    Current Length of Stay: 1 day(s)  Current Patient Status: Inpatient   Certification Statement: The patient will continue to require additional inpatient hospital stay due to monitor above condition  Discharge Plan: Anticipate discharge in 48-72 hrs to to be determined    Code Status: Level 1 - Full Code    Subjective   Seen and evaluated during the event.  Still having short of breath and cough.    Objective :  Temp:  [97.5 °F (36.4 °C)-98.2 °F (36.8 °C)] 97.7 °F (36.5 °C)  HR:  [] 76  BP: (142-245)/() 153/88  Resp:  [18-24] 24  SpO2:  [87 %-97 %] 87 %  O2 Device: Nasal cannula  Nasal Cannula O2 Flow Rate (L/min):  [2 L/min] 2 L/min    Body mass index is 17.38 kg/m².     Input and Output Summary (last 24 hours):     Intake/Output  Summary (Last 24 hours) at 12/2/2024 0903  Last data filed at 12/2/2024 0609  Gross per 24 hour   Intake 620 ml   Output 2250 ml   Net -1630 ml       Physical Exam  Vitals and nursing note reviewed.   Constitutional:       Appearance: He is ill-appearing. He is not diaphoretic.   Eyes:      General: No scleral icterus.        Left eye: No discharge.      Extraocular Movements: Extraocular movements intact.      Conjunctiva/sclera: Conjunctivae normal.      Pupils: Pupils are equal, round, and reactive to light.   Cardiovascular:      Rate and Rhythm: Tachycardia present.      Heart sounds: No murmur heard.     No friction rub. No gallop.   Pulmonary:      Breath sounds: No stridor. Wheezing and rhonchi present. No rales.   Abdominal:      General: There is no distension.   Neurological:      Mental Status: He is alert and oriented to person, place, and time.      Cranial Nerves: No cranial nerve deficit.      Sensory: No sensory deficit.      Motor: No weakness.      Coordination: Coordination normal.           Lines/Drains:              Lab Results: I have reviewed the following results:   Results from last 7 days   Lab Units 12/02/24  0538 12/01/24  1125   WBC Thousand/uL 4.74 9.15   HEMOGLOBIN g/dL 15.0 17.2*   HEMATOCRIT % 43.0 48.0   PLATELETS Thousands/uL 250 307   BANDS PCT % 1  --    SEGS PCT %  --  79*   LYMPHO PCT % 22 12*   MONO PCT % 10 8   EOS PCT % 0 0     Results from last 7 days   Lab Units 12/02/24  0545 12/02/24  0538   SODIUM mmol/L 131* 132*   POTASSIUM mmol/L 4.0 4.0   CHLORIDE mmol/L 99 99   CO2 mmol/L 22 24   BUN mg/dL 13 13   CREATININE mg/dL 0.84 0.86   ANION GAP mmol/L 10 9   CALCIUM mg/dL 8.5 8.7   ALBUMIN g/dL  --  3.8   TOTAL BILIRUBIN mg/dL  --  0.30   ALK PHOS U/L  --  81   ALT U/L  --  32   AST U/L  --  26   GLUCOSE RANDOM mg/dL 117 116     Results from last 7 days   Lab Units 12/01/24  1125   INR  0.91     Results from last 7 days   Lab Units 12/02/24  0639   POC GLUCOSE mg/dl 149*          Results from last 7 days   Lab Units 12/02/24  0538 12/01/24  1432 12/01/24  1125   LACTIC ACID mmol/L  --  1.5 2.1*   PROCALCITONIN ng/ml <0.05 <0.05  --        Recent Cultures (last 7 days):   Results from last 7 days   Lab Units 12/01/24  1608 12/01/24  1125   BLOOD CULTURE   --  Received in Microbiology Lab. Culture in Progress.  Received in Microbiology Lab. Culture in Progress.   LEGIONELLA URINARY ANTIGEN  Negative  --        Imaging Results Review: No pertinent imaging studies reviewed.  Other Study Results Review: No additional pertinent studies reviewed.    Last 24 Hours Medication List:     Current Facility-Administered Medications:     acetaminophen (TYLENOL) tablet 650 mg, Q6H PRN    amLODIPine (NORVASC) tablet 5 mg, Daily    azithromycin (ZITHROMAX) tablet 500 mg, Q24H    budesonide (PULMICORT) inhalation solution 0.5 mg, Q12H    cefTRIAXone (ROCEPHIN) IVPB (premix in dextrose) 1,000 mg 50 mL, Q24H    enoxaparin (LOVENOX) subcutaneous injection 40 mg, Daily    folic acid (FOLVITE) tablet 1 mg, Daily    guaiFENesin (MUCINEX) 12 hr tablet 600 mg, Q12H MIRNA    hydrALAZINE (APRESOLINE) injection 10 mg, Q6H PRN    ipratropium (ATROVENT) 0.02 % inhalation solution 0.5 mg, TID    labetalol (NORMODYNE) injection 10 mg, Q4H PRN    levalbuterol (XOPENEX) inhalation solution 1.25 mg, TID **AND** [DISCONTINUED] sodium chloride 0.9 % inhalation solution 3 mL, TID    methylPREDNISolone sodium succinate (Solu-MEDROL) injection 40 mg, Q12H MIRNA    multivitamin-minerals (CENTRUM) tablet 1 tablet, Daily    nicotine (NICODERM CQ) 21 mg/24 hr TD 24 hr patch 1 patch, Daily    ondansetron (ZOFRAN) injection 4 mg, Q6H PRN    roflumilast (DALIRESP) tablet 500 mcg, Daily    sodium chloride 0.9 % infusion, Continuous, Last Rate: 100 mL/hr (12/02/24 0736)    thiamine tablet 100 mg, Daily    Administrative Statements   Today, Patient Was Seen By: Ashley Powers MD      **Please Note: This note may have been constructed  using a voice recognition system.**

## 2024-12-02 NOTE — ASSESSMENT & PLAN NOTE
Meet criteria with tachycardia, tachypnea, with source of infection of pneumonia with recent history of influenza A  Imaging shows:Groundglass nodularity demonstrated in the right middle/lower lobe as well as lingula.  PE negative.  Flu positive-symptoms started 6 days ago, unable to start Tamiflu  Status post IV hydration and IV antibiotic  Continue IV hydration, IV antibiotic  Follow-up, blood culture  Urine Legionella pneumonia negative, procalcitonin x 2 negative  Follow respiratory protocol

## 2024-12-02 NOTE — RESPIRATORY THERAPY NOTE
RT Protocol Note  Mihir Aldrich 64 y.o. male MRN: [id number]  Unit/Bed#: -01 Encounter: [id number]    Assessment    Principal Problem:    Sepsis (HCC)  Active Problems:    Chronic obstructive pulmonary disease with acute lower respiratory infection (HCC)    Tobacco abuse    Chronic hyponatremia    Acute respiratory failure with hypoxia (HCC)    Pneumonia    Alcohol abuse    Ectatic thoracic aorta (HCC)    Pulmonary nodule, right    Influenza A      Home Pulmonary Medications:  Albuterol HFA, Stiolto Respimat       Past Medical History:   Diagnosis Date    Atrial tachycardia (HCC)     BPH (benign prostatic hyperplasia)     COPD (chronic obstructive pulmonary disease) (HCC)     Hypertension     Osteoarthritis     Peripheral vascular disease (HCC)     stenting b/l iliac stenting 8/12/2019    Severe epistaxis      Social History     Socioeconomic History    Marital status: /Civil Union     Spouse name: None    Number of children: None    Years of education: None    Highest education level: None   Occupational History    None   Tobacco Use    Smoking status: Every Day     Current packs/day: 1.00     Types: Cigarettes    Smokeless tobacco: Never   Vaping Use    Vaping status: Never Used   Substance and Sexual Activity    Alcohol use: Yes     Comment: 5-6 beers a day    Drug use: Never    Sexual activity: Yes     Comment: Defer   Other Topics Concern    None   Social History Narrative    None     Social Drivers of Health     Financial Resource Strain: Not on file   Food Insecurity: No Food Insecurity (12/1/2024)    Nursing - Inadequate Food Risk Classification     Worried About Running Out of Food in the Last Year: Not on file     Ran Out of Food in the Last Year: Not on file     Ran Out of Food in the Last Year: 1   Transportation Needs: No Transportation Needs (12/1/2024)    Nursing - Transportation Risk Classification     Lack of Transportation: Not on file     Lack of Transportation: 2   Physical  "Activity: Not on file   Stress: Not on file   Social Connections: Not on file   Intimate Partner Violence: Unknown (2024)    Nursing IPS     Feels Physically and Emotionally Safe: Not on file     Physically Hurt by Someone: Not on file     Humiliated or Emotionally Abused by Someone: Not on file     Physically Hurt by Someone: 2     Hurt or Threatened by Someone: 2   Housing Stability: Unknown (2024)    Nursing: Inadequate Housing Risk Classification     Has Housing: Not on file     Worried About Losing Housing: Not on file     Unable to Get Utilities: Not on file     Unable to Pay for Housing in the Last Year: 2     Has Housin       Subjective         Objective    Physical Exam:   Assessment Type: Assess only  General Appearance: Alert, Awake  Respiratory Pattern: Dyspnea at rest, Dyspnea with exertion  Chest Assessment: Chest expansion symmetrical  Bilateral Breath Sounds: Diminished  Cough: None  O2 Device: 2L NC    Vitals:  Blood pressure (!) 208/105, pulse 76, temperature 97.7 °F (36.5 °C), resp. rate (!) 24, height 5' 11\" (1.803 m), weight 56.5 kg (124 lb 9.6 oz), SpO2 (!) 87%.          Imaging and other studies:     O2 Device: 2L NC     Plan    Respiratory Plan: Home Bronchodilator Patient pathway        Resp Comments: Pt received sitting on side of bed, neb tx administered early for SOB and RRT on previous shift. Pt stated he still has some SOB and tachypnea but is a little better.   "

## 2024-12-03 VITALS
HEART RATE: 105 BPM | HEIGHT: 71 IN | OXYGEN SATURATION: 90 % | BODY MASS INDEX: 16.21 KG/M2 | SYSTOLIC BLOOD PRESSURE: 147 MMHG | TEMPERATURE: 97.6 F | DIASTOLIC BLOOD PRESSURE: 96 MMHG | WEIGHT: 115.8 LBS | RESPIRATION RATE: 20 BRPM

## 2024-12-03 LAB
ALBUMIN SERPL BCG-MCNC: 4 G/DL (ref 3.5–5)
ALP SERPL-CCNC: 83 U/L (ref 34–104)
ALT SERPL W P-5'-P-CCNC: 31 U/L (ref 7–52)
ANION GAP SERPL CALCULATED.3IONS-SCNC: 12 MMOL/L (ref 4–13)
AST SERPL W P-5'-P-CCNC: 25 U/L (ref 13–39)
BILIRUB SERPL-MCNC: 0.35 MG/DL (ref 0.2–1)
BUN SERPL-MCNC: 17 MG/DL (ref 5–25)
CALCIUM SERPL-MCNC: 9.2 MG/DL (ref 8.4–10.2)
CHLORIDE SERPL-SCNC: 97 MMOL/L (ref 96–108)
CO2 SERPL-SCNC: 26 MMOL/L (ref 21–32)
CREAT SERPL-MCNC: 0.89 MG/DL (ref 0.6–1.3)
ERYTHROCYTE [DISTWIDTH] IN BLOOD BY AUTOMATED COUNT: 13.1 % (ref 11.6–15.1)
GFR SERPL CREATININE-BSD FRML MDRD: 89 ML/MIN/1.73SQ M
GLUCOSE SERPL-MCNC: 142 MG/DL (ref 65–140)
HCT VFR BLD AUTO: 45.4 % (ref 36.5–49.3)
HGB BLD-MCNC: 16.2 G/DL (ref 12–17)
MCH RBC QN AUTO: 32.5 PG (ref 26.8–34.3)
MCHC RBC AUTO-ENTMCNC: 35.7 G/DL (ref 31.4–37.4)
MCV RBC AUTO: 91 FL (ref 82–98)
PLATELET # BLD AUTO: 266 THOUSANDS/UL (ref 149–390)
PMV BLD AUTO: 9 FL (ref 8.9–12.7)
POTASSIUM SERPL-SCNC: 3.3 MMOL/L (ref 3.5–5.3)
PROT SERPL-MCNC: 7.1 G/DL (ref 6.4–8.4)
RBC # BLD AUTO: 4.99 MILLION/UL (ref 3.88–5.62)
SODIUM SERPL-SCNC: 135 MMOL/L (ref 135–147)
WBC # BLD AUTO: 5.3 THOUSAND/UL (ref 4.31–10.16)

## 2024-12-03 PROCEDURE — 80053 COMPREHEN METABOLIC PANEL: CPT | Performed by: FAMILY MEDICINE

## 2024-12-03 PROCEDURE — 85027 COMPLETE CBC AUTOMATED: CPT | Performed by: FAMILY MEDICINE

## 2024-12-03 PROCEDURE — 94761 N-INVAS EAR/PLS OXIMETRY MLT: CPT

## 2024-12-03 PROCEDURE — 94760 N-INVAS EAR/PLS OXIMETRY 1: CPT

## 2024-12-03 PROCEDURE — 99239 HOSP IP/OBS DSCHRG MGMT >30: CPT | Performed by: FAMILY MEDICINE

## 2024-12-03 PROCEDURE — 94640 AIRWAY INHALATION TREATMENT: CPT

## 2024-12-03 PROCEDURE — 94664 DEMO&/EVAL PT USE INHALER: CPT

## 2024-12-03 RX ORDER — ALBUTEROL SULFATE 90 UG/1
2 INHALANT RESPIRATORY (INHALATION) EVERY 6 HOURS PRN
Qty: 18 G | Refills: 0 | Status: SHIPPED | OUTPATIENT
Start: 2024-12-03

## 2024-12-03 RX ORDER — NICOTINE 21 MG/24HR
1 PATCH, TRANSDERMAL 24 HOURS TRANSDERMAL DAILY
Qty: 28 PATCH | Refills: 0 | Status: SHIPPED | OUTPATIENT
Start: 2024-12-04

## 2024-12-03 RX ORDER — TIOTROPIUM BROMIDE AND OLODATEROL 3.124; 2.736 UG/1; UG/1
2 SPRAY, METERED RESPIRATORY (INHALATION) DAILY
Qty: 4 G | Refills: 2 | Status: SHIPPED | OUTPATIENT
Start: 2024-12-03 | End: 2025-01-02

## 2024-12-03 RX ORDER — GUAIFENESIN 600 MG/1
600 TABLET, EXTENDED RELEASE ORAL EVERY 12 HOURS SCHEDULED
Qty: 60 TABLET | Refills: 0 | Status: SHIPPED | OUTPATIENT
Start: 2024-12-03

## 2024-12-03 RX ORDER — LANOLIN ALCOHOL/MO/W.PET/CERES
100 CREAM (GRAM) TOPICAL DAILY
Qty: 30 TABLET | Refills: 0 | Status: SHIPPED | OUTPATIENT
Start: 2024-12-04

## 2024-12-03 RX ORDER — PREDNISONE 20 MG/1
TABLET ORAL
Qty: 19 TABLET | Refills: 0 | Status: SHIPPED | OUTPATIENT
Start: 2024-12-03 | End: 2024-12-17

## 2024-12-03 RX ORDER — IPRATROPIUM BROMIDE AND ALBUTEROL SULFATE 2.5; .5 MG/3ML; MG/3ML
3 SOLUTION RESPIRATORY (INHALATION) 4 TIMES DAILY
Qty: 360 ML | Refills: 0 | Status: SHIPPED | OUTPATIENT
Start: 2024-12-03

## 2024-12-03 RX ORDER — FOLIC ACID 1 MG/1
1 TABLET ORAL DAILY
Qty: 30 TABLET | Refills: 0 | Status: SHIPPED | OUTPATIENT
Start: 2024-12-04

## 2024-12-03 RX ORDER — LISINOPRIL 5 MG/1
5 TABLET ORAL DAILY
Status: DISCONTINUED | OUTPATIENT
Start: 2024-12-03 | End: 2024-12-03 | Stop reason: HOSPADM

## 2024-12-03 RX ORDER — AMLODIPINE BESYLATE 10 MG/1
10 TABLET ORAL DAILY
Qty: 30 TABLET | Refills: 0 | Status: SHIPPED | OUTPATIENT
Start: 2024-12-03 | End: 2025-01-02

## 2024-12-03 RX ADMIN — LISINOPRIL 5 MG: 5 TABLET ORAL at 11:28

## 2024-12-03 RX ADMIN — LABETALOL HYDROCHLORIDE 10 MG: 5 INJECTION, SOLUTION INTRAVENOUS at 08:04

## 2024-12-03 RX ADMIN — IPRATROPIUM BROMIDE 0.5 MG: 0.5 SOLUTION RESPIRATORY (INHALATION) at 14:14

## 2024-12-03 RX ADMIN — METHYLPREDNISOLONE SODIUM SUCCINATE 40 MG: 40 INJECTION, POWDER, FOR SOLUTION INTRAMUSCULAR; INTRAVENOUS at 08:04

## 2024-12-03 RX ADMIN — THIAMINE HCL TAB 100 MG 100 MG: 100 TAB at 08:04

## 2024-12-03 RX ADMIN — GUAIFENESIN 600 MG: 600 TABLET ORAL at 08:04

## 2024-12-03 RX ADMIN — LEVALBUTEROL HYDROCHLORIDE 1.25 MG: 1.25 SOLUTION RESPIRATORY (INHALATION) at 14:14

## 2024-12-03 RX ADMIN — Medication 1 TABLET: at 08:04

## 2024-12-03 RX ADMIN — ROFLUMILAST 500 MCG: 250 TABLET ORAL at 08:05

## 2024-12-03 RX ADMIN — BUDESONIDE INHALATION 0.5 MG: 0.5 SUSPENSION RESPIRATORY (INHALATION) at 07:53

## 2024-12-03 RX ADMIN — IPRATROPIUM BROMIDE 0.5 MG: 0.5 SOLUTION RESPIRATORY (INHALATION) at 07:54

## 2024-12-03 RX ADMIN — FOLIC ACID 1 MG: 1 TABLET ORAL at 08:04

## 2024-12-03 RX ADMIN — LEVALBUTEROL HYDROCHLORIDE 1.25 MG: 1.25 SOLUTION RESPIRATORY (INHALATION) at 07:53

## 2024-12-03 RX ADMIN — AMLODIPINE BESYLATE 5 MG: 5 TABLET ORAL at 08:04

## 2024-12-03 NOTE — DISCHARGE SUMMARY
Discharge Summary - Hospitalist   Name: Mihir Aldrich 64 y.o. male I MRN: [id number]  Unit/Bed#: -01 I Date of Admission: 12/1/2024   Date of Service: 12/3/2024 I Hospital Day: 2     Assessment & Plan  Sepsis (HCC)  Meet criteria with tachycardia, tachypnea, with source of infection of pneumonia with recent history of influenza A  Imaging shows:Groundglass nodularity demonstrated in the right middle/lower lobe as well as lingula.  PE negative.  Flu positive-symptoms started 6 days ago, unable to start Tamiflu  Blood culture shows no growth in 24 hours, urine Legionella pneumonia negative.  Sputum culture is positive for multi organism.  Urine Legionella pneumonia negative, procalcitonin x 2 negative  Patient decided to leave AGAINST MEDICAL ADVICE and signed the form after discussion is done regarding risk versus benefits, side effects and alternative.  Patient's wife at the bedside.  Patient is discharging with p.o. antibiotic with follow-up with PCP.  Chronic hyponatremia  Per chart review, sodium level around 127  Patient came with sodium 127  Per patient also drinks 6 beers per day  Patient is status post 2 L of bolus per sepsis protocol-continue to monitor BMP every 4 hours to avoid rapid correction  Sodium improved  Acute respiratory failure with hypoxia (HCC)  Patient is saturating 88% on room air, currently using 2 L of oxygen to maintain adequate saturation  CTA PE negative but patient has pneumonia.  Also tested positive for flu  Patient has history of COPD  Continue treatment for COPD, pneumonia  Adjust oxygen as tolerated.  ABG showing compensating respiratory alkalosis.  Blood cultures so far no growth, urine Legionella pneumonia negative.  Sputum culture is growing multi organism.  Patient is going home with antibiotic.  Patient signed AGAINST MEDICAL ADVICE.  Home O2 evaluation done, patient is requiring 6 L of oxygen with ambulation, 2 L during rest-discussed in details with patient and  his wife at the bedside regarding the safety.  Patient is still remain from on his decision and signed AMA form.  Pneumonia  Patient symptoms started 6 days ago, now flu positive-imaging shows right middle/lower lobe groundglass opacity-suspect superinfection postviral infection  Received IV antibiotic, prescribing p.o. antibiotic since patient signed AMA with recommendation to follow-up with PCP.  Chronic obstructive pulmonary disease with acute lower respiratory infection (HCC)  Came with acute respiratory failure with hypoxia, tested positive for flu A, also having pneumonia-all above is contributing exacerbation  Continue treatment per respiratory protocol, and a nebulizer of steroid,  Patient decided to sign AMA.  Home O2 oxygen evaluation done, patient does require 2 L of oxygen on rest 6 L during ambulation.  I discussed in details with patient and his wife at the bedside.  Verbalized understanding agrees.  Per patient, he is supposed to follow-up with pulmonologist but get frustrated since nobody get back from pulmonologist office and for that reason patient stopped following up with them and just follow-up with PCP at this time.    Tobacco abuse  NicoDerm patch  Alcohol abuse  Drinks 6 beers per day-last drink yesterday on 11/30/2024, about 2-3 beer  Follow CIWA protocol  Ectatic thoracic aorta (HCC)  Borderline 3.9 cm fusiform ascending thoracic aortic ectasia.   Needs outpatient follow-up.  Pulmonary nodule, right  4 mm right upper lobe nodule.   Patient is a smoker, and is outpatient follow-up  Influenza A  Influenza A positive  Symptoms started about 6 days ago, out of window to start any Tamiflu  At this time, continue conservative management  Maintain isolation  Uncontrolled hypertension  Per patient, he was taking lisinopril at certain point met the highest dose and he still was not working for that reason he is not taking lisinopril anymore.  Continue amlodipine, will increase the dose to 10 mg.      Discharging Physician / Practitioner: Ashley Powers MD  PCP: Koko Lopez DO  Admission Date:   Admission Orders (From admission, onward)       Ordered        12/01/24 1352  INPATIENT ADMISSION  Once                          Discharge Date: 12/03/24    Medical Problems       Resolved Problems  Date Reviewed: 12/28/2023   None         Consultations During Hospital Stay:  none    Procedures Performed:   XR humerus left   Final Result by Willie Malloy MD (12/02 0627)      No acute osseous abnormality. Finding described in the proximal left humerus on the prior CT is likely related to motion.                  Workstation performed: [id number]         CTA chest pe study   Final Result by Preet Carson MD (12/01 1325)      No pulmonary embolism   Trace reflux of injected contrast into the intrahepatic IVC and proximal hepatic veins can be seen with a degree of pulmonary arterial hypertension.      Groundglass nodularity demonstrated in the right middle/lower lobe as well as lingula.   Findings consistent with an infectious/inflammatory infiltrate.      4 mm right upper lobe nodule.   Borderline 3.9 cm fusiform ascending thoracic aortic ectasia.   Recommend follow-up chest CT in 12 months.      Incidental note of cortical indistinctness within the left proximal humerus.   Most likely artifactual related to motion and/or streak artifact.   Radiographs could be considered if clinically warranted.      The study was marked in EPIC for immediate notification.               Workstation performed: [id number]         XR chest 1 view portable   ED Interpretation by Yo Hernadez MD (12/01 1334)   COPD changes.  No definite infiltrate      Final Result by Alem Jones MD (12/01 1811)      No acute cardiopulmonary disease.      Emphysema.      See subsequent chest CT.      Workstation performed: [id number]               Significant Findings / Test Results:   Lab Results   Component Value Date     WBC 5.30 12/03/2024    HGB 16.2 12/03/2024    HCT 45.4 12/03/2024    MCV 91 12/03/2024     12/03/2024     Lab Results   Component Value Date    SODIUM 135 12/03/2024    K 3.3 (L) 12/03/2024    CL 97 12/03/2024    CO2 26 12/03/2024    AGAP 12 12/03/2024    BUN 17 12/03/2024    CREATININE 0.89 12/03/2024    GLUC 142 (H) 12/03/2024    GLUF 129 (H) 06/12/2022    CALCIUM 9.2 12/03/2024    AST 25 12/03/2024    ALT 31 12/03/2024    ALKPHOS 83 12/03/2024    TP 7.1 12/03/2024    TBILI 0.35 12/03/2024    EGFR 89 12/03/2024         Incidental Findings:   Collected Updated Procedure Result Status Patient Facility Result Comment    12/01/2024 2107 12/02/2024 1229 Sputum culture and Gram stain [[id number]]   (Abnormal)   Expectorated Sputum    Preliminary result [facility]  Component Value   Gram Stain Result 1+ Epithelial cells per low power field Abnormal  P    1+ Polys Abnormal  P    2+ Gram positive cocci in pairs, chains and clusters Abnormal  P    1+ Gram negative rods Abnormal  P             12/01/2024 1608 12/01/2024 2314 Legionella antigen, urine [[id number]]   Urine, Clean Catch    Final result [facility]  Component Value   Legionella Urinary Antigen Negative          12/01/2024 1608 12/01/2024 2314 Strep Pneumoniae, Urine [[id number]]   Urine, Clean Catch    Final result [facility]  Component Value   Strep pneumoniae antigen, urine Negative          12/01/2024 1125 12/01/2024 1156 FLU/COVID Rapid Antigen (30 min. TAT) - Preferred screening test in ED [[id number]]    (Abnormal)   Nares from Nose    Final result [facility] This test has been performed using the Quidel Anika 2 FLU+SARS Antigen test under the Emergency Use Authorization (EUA). This test has been validated by the  and verified by the performing laboratory. The Anika uses lateral flow immunofluorescent sandwich assay to detect  SARS-COV, Influenza A and Influenza B Antigen.       The Quidel Anika 2 SARS Antigen test does not differentiate between SARS-CoV and SARS-CoV-2.       Negative results are presumptive and may be confirmed with a molecular assay, if necessary, for patient management. Negative results do not rule out SARS-CoV-2 or influenza infection and should not be used as the sole basis for treatment or patient management decisions. A negative test result may occur if the level of antigen in a sample is below the limit of detection of this test.       Positive results are indicative of the presence of viral antigens, but do not rule out bacterial infection or co-infection with other viruses.       All test results should be used as an adjunct to clinical observations and other information available to the provider.   FOR PEDIATRIC PATIENTS - copy/paste COVID Guidelines URL to browser: [email or web address]    Component Value   SARS COV Rapid Antigen Negative   Influenza A Rapid Antigen Positive Abnormal    Influenza B Rapid Antigen Negative             12/01/2024 1125 12/02/2024 2201 Blood culture #1 [[id number]]   Blood from Arm, Left    Preliminary result [facility]  Component Value   Blood Culture No Growth at 24 hrs. P             12/01/2024 1125 12/02/2024 2201 Blood culture #2 [[id number]]   Blood from Arm, Right    Preliminary result [facility]  Component Value   Blood Culture No Growth at 24 hrs. P           Test Results Pending at Discharge (will require follow up):   As mentioned above     Outpatient Tests Requested:  May need repeat chest x-ray in 4 weeks with PCP    Complications:  none    Reason for Admission: Shortness of breath    Hospital Course:     Mihir Aldrich is a 64 y.o. male patient who originally presented to the hospital on 12/1/2024 due to shortness of breath.  Patient admitted under diagnosis  "of sepsis secondary to pneumonia superinfection secondary to influenza A.  Patient placed on IV antibiotic.  Blood cultures so far no growth, procalcitonin x 2 negative.  Urine Legionella pneumonia negative.  Sputum culture showing multiple stain.  Patient also has severe COPD.  An incidental findings as documented in progress notes and imagings.    On 12/3/2024, patient decided to leave hospital AGAINST MEDICAL ADVICE.-Discussed in details with patient and his wife on the bedside regarding risk versus benefits, side effects and alternative.  Home O2 evaluation done, patient does require 2 L of oxygen at rest and 6 L during ambulation.  After knowing above, patient verbalized understanding agrees and signed AMA form.    Prescription for breathing treatment as well as antibiotics sent to patient's pharmacy with recommendation to follow-up with PCP as soon as possible.  Patient does verbalizes that he has a ride to come back to hospital anytime if his condition worse.  Case management notified to arrange oxygen.    The patient, initially admitted to the hospital as inpatient, was discharged earlier than expected given the following: Patient signed AMA.    Please see above list of diagnoses and related plan for additional information.     Condition at Discharge: fair     Discharge Day Visit / Exam:     Subjective: Seen and evaluated during the run.  Patient still having short of breath and belly breathing.  But patient reports compared to admission days he feels a lot better and is getting out of this hospital today.  Vitals: Blood Pressure: 147/96 (12/03/24 1130)  Pulse: 105 (12/03/24 1130)  Temperature: 97.6 °F (36.4 °C) (12/03/24 1129)  Temp Source: Temporal (12/03/24 1129)  Respirations: 20 (12/03/24 1129)  Height: 5' 11\" (180.3 cm) (12/03/24 1032)  Weight - Scale: 52.5 kg (115 lb 12.8 oz) (weighed pt twice and got the same weight) (12/03/24 0607)  SpO2: (!) 89 % (12/03/24 1100)  Exam:   Physical Exam  Vitals and " nursing note reviewed.   Constitutional:       Appearance: He is ill-appearing. He is not diaphoretic.   HENT:      Mouth/Throat:      Mouth: Mucous membranes are moist.   Eyes:      General: No scleral icterus.        Left eye: No discharge.      Extraocular Movements: Extraocular movements intact.      Conjunctiva/sclera: Conjunctivae normal.      Pupils: Pupils are equal, round, and reactive to light.   Cardiovascular:      Rate and Rhythm: Tachycardia present.      Heart sounds:      No friction rub. No gallop.   Pulmonary:      Breath sounds: Rhonchi present. No wheezing or rales.      Comments: Short of Breath, and tachypneic.  Abdominal:      General: There is no distension.      Palpations: There is no mass.      Tenderness: There is no abdominal tenderness.      Hernia: No hernia is present.   Musculoskeletal:      Right lower leg: No edema.      Left lower leg: No edema.   Neurological:      Mental Status: He is alert and oriented to person, place, and time.      Cranial Nerves: No cranial nerve deficit.      Sensory: No sensory deficit.      Motor: No weakness.      Coordination: Coordination normal.       Discussion with Family: Patient wife on the bedside    Discharge instructions/Information to patient and family:   See after visit summary for information provided to patient and family.      Provisions for Follow-Up Care:  See after visit summary for information related to follow-up care and any pertinent home health orders.      Disposition:     Home-left AGAINST MEDICAL ADVICE    For Discharges to [facility] SNF:   Not Applicable to this Patient - Not Applicable to this Patient    Planned Readmission: If condition get worse     Discharge Statement:  Greater than 50% of the total time was spent examining patient, answering all patient questions, arranging and discussing plan of care with patient as well as directly providing post-discharge instructions.  Additional time then spent on  discharge activities.    Discharge Medications:  See after visit summary for reconciled discharge medications provided to patient and family.      ** Please Note: This note has been constructed using a voice recognition system **

## 2024-12-03 NOTE — NURSING NOTE
Patient agitated and anxious. States he is leaving today. Pulse ox 88-89% on O2 at 2l. BP elevated. Dr. Powers aware that PRN IV lobetolol given. Will monitor.  
Patient signing out AMA. Dr Powers aware and had him sign the AMA form. Case management to deliver oxygen to patient to go home with.  
Pt bladder scan is 478 but refusing to be straight cathed at this time. Pt informed of the risks of refusal and insists on urinating on his own.   
Left arm;

## 2024-12-03 NOTE — PLAN OF CARE
Problem: Nutrition/Hydration-ADULT  Goal: Nutrient/Hydration intake appropriate for improving, restoring or maintaining nutritional needs  Description: Monitor and assess patient's nutrition/hydration status for malnutrition. Collaborate with interdisciplinary team and initiate plan and interventions as ordered.  Monitor patient's weight and dietary intake as ordered or per policy. Utilize nutrition screening tool and intervene as necessary. Determine patient's food preferences and provide high-protein, high-caloric foods as appropriate.     INTERVENTIONS:  - Monitor oral intake, urinary output, labs, and treatment plans  - Assess nutrition and hydration status and recommend course of action  - Evaluate amount of meals eaten  - Assist patient with eating if necessary   - Allow adequate time for meals  - Recommend/ encourage appropriate diets, oral nutritional supplements, and vitamin/mineral supplements  - Order, calculate, and assess calorie counts as needed  - Recommend, monitor, and adjust tube feedings and TPN/PPN based on assessed needs  - Assess need for intravenous fluids  - Provide specific nutrition/hydration education as appropriate  - Include patient/family/caregiver in decisions related to nutrition  Outcome: Progressing     Problem: PAIN - ADULT  Goal: Verbalizes/displays adequate comfort level or baseline comfort level  Description: Interventions:  - Encourage patient to monitor pain and request assistance  - Assess pain using appropriate pain scale  - Administer analgesics based on type and severity of pain and evaluate response  - Implement non-pharmacological measures as appropriate and evaluate response  - Consider cultural and social influences on pain and pain management  - Notify physician/advanced practitioner if interventions unsuccessful or patient reports new pain  Outcome: Progressing     Problem: INFECTION - ADULT  Goal: Absence or prevention of progression during  hospitalization  Description: INTERVENTIONS:  - Assess and monitor for signs and symptoms of infection  - Monitor lab/diagnostic results  - Monitor all insertion sites, i.e. indwelling lines, tubes, and drains  - Monitor endotracheal if appropriate and nasal secretions for changes in amount and color  - [place] appropriate cooling/warming therapies per order  - Administer medications as ordered  - Instruct and encourage patient and family to use good hand hygiene technique  - Identify and instruct in appropriate isolation precautions for identified infection/condition  Outcome: Progressing  Goal: Absence of fever/infection during neutropenic period  Description: INTERVENTIONS:  - Monitor WBC    Outcome: Progressing     Problem: SAFETY ADULT  Goal: Patient will remain free of falls  Description: INTERVENTIONS:  - Educate patient/family on patient safety including physical limitations  - Instruct patient to call for assistance with activity   - Consult OT/PT to assist with strengthening/mobility   - Keep Call bell within reach  - Keep bed low and locked with side rails adjusted as appropriate  - Keep care items and personal belongings within reach  - Initiate and maintain comfort rounds  - Make Fall Risk Sign visible to staff  - Apply yellow socks and bracelet for high fall risk patients  - Consider moving patient to room near nurses station  Outcome: Progressing  Goal: Maintain or return to baseline ADL function  Description: INTERVENTIONS:  -  Assess patient's ability to carry out ADLs; assess patient's baseline for ADL function and identify physical deficits which impact ability to perform ADLs (bathing, care of mouth/teeth, toileting, grooming, dressing, etc.)  - Assess/evaluate cause of self-care deficits   - Assess range of motion  - Assess patient's mobility; develop plan if impaired  - Assess patient's need for assistive devices and provide as appropriate  - Encourage maximum independence but intervene and  supervise when necessary  - Involve family in performance of ADLs  - Assess for home care needs following discharge   - Consider OT consult to assist with ADL evaluation and planning for discharge  - Provide patient education as appropriate  Outcome: Progressing  Goal: Maintains/Returns to pre admission functional level  Description: INTERVENTIONS:  - Perform AM-PAC 6 Click Basic Mobility/ Daily Activity assessment daily.  - Set and communicate daily mobility goal to care team and patient/family/caregiver.   - Collaborate with rehabilitation services on mobility goals if consulted  - Record patient progress and toleration of activity level   Outcome: Progressing     Problem: DISCHARGE PLANNING  Goal: Discharge to home or other facility with appropriate resources  Description: INTERVENTIONS:  - Identify barriers to discharge w/patient and caregiver  - Arrange for needed discharge resources and transportation as appropriate  - Identify discharge learning needs (meds, wound care, etc.)  - Arrange for interpretive services to assist at discharge as needed  - Refer to Case Management Department for coordinating discharge planning if the patient needs post-hospital services based on physician/advanced practitioner order or complex needs related to functional status, cognitive ability, or social support system  Outcome: Progressing     Problem: Knowledge Deficit  Goal: Patient/family/caregiver demonstrates understanding of disease process, treatment plan, medications, and discharge instructions  Description: Complete learning assessment and assess knowledge base.  Interventions:  - Provide teaching at level of understanding  - Provide teaching via preferred learning methods  Outcome: Progressing

## 2024-12-03 NOTE — ASSESSMENT & PLAN NOTE
Per patient, he was taking lisinopril at certain point met the highest dose and he still was not working for that reason he is not taking lisinopril anymore.  Continue amlodipine, will increase the dose to 10 mg.

## 2024-12-03 NOTE — PLAN OF CARE
Problem: Nutrition/Hydration-ADULT  Goal: Nutrient/Hydration intake appropriate for improving, restoring or maintaining nutritional needs  Description: Monitor and assess patient's nutrition/hydration status for malnutrition. Collaborate with interdisciplinary team and initiate plan and interventions as ordered.  Monitor patient's weight and dietary intake as ordered or per policy. Utilize nutrition screening tool and intervene as necessary. Determine patient's food preferences and provide high-protein, high-caloric foods as appropriate.     INTERVENTIONS:  - Monitor oral intake, urinary output, labs, and treatment plans  - Assess nutrition and hydration status and recommend course of action  - Evaluate amount of meals eaten  - Assist patient with eating if necessary   - Allow adequate time for meals  - Recommend/ encourage appropriate diets, oral nutritional supplements, and vitamin/mineral supplements  - Order, calculate, and assess calorie counts as needed  - Recommend, monitor, and adjust tube feedings and TPN/PPN based on assessed needs  - Assess need for intravenous fluids  - Provide specific nutrition/hydration education as appropriate  - Include patient/family/caregiver in decisions related to nutrition  Outcome: Progressing     Problem: PAIN - ADULT  Goal: Verbalizes/displays adequate comfort level or baseline comfort level  Description: Interventions:  - Encourage patient to monitor pain and request assistance  - Assess pain using appropriate pain scale  - Administer analgesics based on type and severity of pain and evaluate response  - Implement non-pharmacological measures as appropriate and evaluate response  - Consider cultural and social influences on pain and pain management  - Notify physician/advanced practitioner if interventions unsuccessful or patient reports new pain  Outcome: Progressing     Problem: INFECTION - ADULT  Goal: Absence or prevention of progression during  hospitalization  Description: INTERVENTIONS:  - Assess and monitor for signs and symptoms of infection  - Monitor lab/diagnostic results  - Monitor all insertion sites, i.e. indwelling lines, tubes, and drains  - Monitor endotracheal if appropriate and nasal secretions for changes in amount and color  - [place] appropriate cooling/warming therapies per order  - Administer medications as ordered  - Instruct and encourage patient and family to use good hand hygiene technique  - Identify and instruct in appropriate isolation precautions for identified infection/condition  Outcome: Progressing     Problem: SAFETY ADULT  Goal: Patient will remain free of falls  Description: INTERVENTIONS:  - Educate patient/family on patient safety including physical limitations  - Instruct patient to call for assistance with activity   - Consult OT/PT to assist with strengthening/mobility   - Keep Call bell within reach  - Keep bed low and locked with side rails adjusted as appropriate  - Keep care items and personal belongings within reach  - Initiate and maintain comfort rounds  - Make Fall Risk Sign visible to staff    - Apply yellow socks and bracelet for high fall risk patients  - Consider moving patient to room near nurses station  Outcome: Progressing  Goal: Maintain or return to baseline ADL function  Description: INTERVENTIONS:  -  Assess patient's ability to carry out ADLs; assess patient's baseline for ADL function and identify physical deficits which impact ability to perform ADLs (bathing, care of mouth/teeth, toileting, grooming, dressing, etc.)  - Assess/evaluate cause of self-care deficits   - Assess range of motion  - Assess patient's mobility; develop plan if impaired  - Assess patient's need for assistive devices and provide as appropriate  - Encourage maximum independence but intervene and supervise when necessary  - Involve family in performance of ADLs  - Assess for home care needs following discharge   -  Consider OT consult to assist with ADL evaluation and planning for discharge  - Provide patient education as appropriate  Outcome: Progressing  Goal: Maintains/Returns to pre admission functional level  Description: INTERVENTIONS:  - Perform AM-PAC 6 Click Basic Mobility/ Daily Activity assessment daily.  - Set and communicate daily mobility goal to care team and patient/family/caregiver.   - Collaborate with rehabilitation services on mobility goals if consulted    - Out of bed for toileting  - Record patient progress and toleration of activity level   Outcome: Progressing     Problem: DISCHARGE PLANNING  Goal: Discharge to home or other facility with appropriate resources  Description: INTERVENTIONS:  - Identify barriers to discharge w/patient and caregiver  - Arrange for needed discharge resources and transportation as appropriate  - Identify discharge learning needs (meds, wound care, etc.)  - Arrange for interpretive services to assist at discharge as needed  - Refer to Case Management Department for coordinating discharge planning if the patient needs post-hospital services based on physician/advanced practitioner order or complex needs related to functional status, cognitive ability, or social support system  Outcome: Progressing     Problem: Knowledge Deficit  Goal: Patient/family/caregiver demonstrates understanding of disease process, treatment plan, medications, and discharge instructions  Description: Complete learning assessment and assess knowledge base.  Interventions:  - Provide teaching at level of understanding  - Provide teaching via preferred learning methods  Outcome: Progressing

## 2024-12-03 NOTE — RESPIRATORY THERAPY NOTE
Home Oxygen Qualifying Test     Patient name: Mihir Aldrich        : 1960   Date of Test:  December 3, 2024  Diagnosis:    Home Oxygen Test:    **Medicare Guidelines require item(s) 1-5 on all ambulatory patients or 1 and 2 on non-ambulatory patients.    1. Baseline SPO2 on Room Air at rest 84 %   If <= 88% on Room Air add O2 via NC to obtain SpO2 >=88%. If LPM needed, document LPM 2 needed to reach =>88%    SPO2 during exertion on Room Air 89  %  During exertion monitor SPO2. If SPO2 increases >=89%, do not add supplemental oxygen    SPO2 on Oxygen at Rest 89 % at 2 LPM    SPO2 during exertion on Oxygen 90 % at 6 LPM    Test performed during exertion activity.      [x]  Supplemental Home Oxygen is indicated.    []  Client does not qualify for home oxygen.    Respiratory Additional Notes- Pt required 2LPM NC at rest and 6LPM NC with exertion to maintain SpO2>88%.    Nanda Addison, RT

## 2024-12-03 NOTE — CASE MANAGEMENT
Case Management Discharge Planning Note    Patient name Mihir Aldrich  Location /-01 MRN [id number]  : 1960 Date 12/3/2024       Current Admission Date: 2024  Current Admission Diagnosis:Sepsis (HCC)   Patient Active Problem List    Diagnosis Date Noted Date Diagnosed    Sepsis (HCC) 2024     Acute respiratory failure with hypoxia (HCC) 2024     Pneumonia 2024     Alcohol abuse 2024     Ectatic thoracic aorta (HCC) 2024     Pulmonary nodule, right 2024     Influenza A 2024     Tobacco abuse 2022     Moderate protein-calorie malnutrition (HCC) 2022     Chronic hyponatremia 2022     Atrial tachycardia (HCC) 2022     Epistaxis 2022     Atrial ectopy 2020     Osteoarthritis      Chronic obstructive pulmonary disease with acute lower respiratory infection (HCC)      BPH (benign prostatic hyperplasia)      Family history of coronary artery disease 2020     Uncontrolled hypertension      Precordial pain      Peripheral vascular disease (HCC)        LOS (days): 2  Geometric Mean LOS (GMLOS) (days): 4.9  Days to GMLOS:3     OBJECTIVE:  Risk of Unplanned Readmission Score: 11.71         Current admission status: Inpatient   Preferred Pharmacy:   [place] [place] - [address] [facility]  [address] [facility] [address]  Phone: [phone] Fax: [phone]    [facility] [address] [place] [address] [address] [facility]  [address] [facility] [address]  Phone: [phone] Fax: [phone]    Primary Care Provider: Koko Lopez DO    Primary Insurance: MEDICARE  Secondary Insurance: BLUE CROSS    DISCHARGE DETAILS:                                     DME Referral Provided  Referral made for DME?: Yes  DME referral completed for the following items:: Home Oxygen concentrator, Portable Oxygen tanks  DME Supplier Name:: [employer]    Other  Referral/Resources/Interventions Provided:  Interventions: DME  Referral Comments: [employer]          CM submitted [place] referral for home O2 with [facility].  Patient will require 2L O2 at rest and 6L O2 upon exertion.    1245 CM made aware patient will need nebulizer for discharge needs from bedside nurse.  CM submitted [place] referral for nebulizer.     1445 CM delivered 2 O2 tanks and nebulizer to patient in his room for discharge today.

## 2024-12-03 NOTE — PLAN OF CARE
Problem: Nutrition/Hydration-ADULT  Goal: Nutrient/Hydration intake appropriate for improving, restoring or maintaining nutritional needs  Description: Monitor and assess patient's nutrition/hydration status for malnutrition. Collaborate with interdisciplinary team and initiate plan and interventions as ordered.  Monitor patient's weight and dietary intake as ordered or per policy. Utilize nutrition screening tool and intervene as necessary. Determine patient's food preferences and provide high-protein, high-caloric foods as appropriate.     INTERVENTIONS:  - Monitor oral intake, urinary output, labs, and treatment plans  - Assess nutrition and hydration status and recommend course of action  - Evaluate amount of meals eaten  - Assist patient with eating if necessary   - Allow adequate time for meals  - Recommend/ encourage appropriate diets, oral nutritional supplements, and vitamin/mineral supplements  - Order, calculate, and assess calorie counts as needed  - Recommend, monitor, and adjust tube feedings and TPN/PPN based on assessed needs  - Assess need for intravenous fluids  - Provide specific nutrition/hydration education as appropriate  - Include patient/family/caregiver in decisions related to nutrition  12/3/2024 1220 by Lula Cardona RN  Outcome: Adequate for Discharge  12/3/2024 0934 by Lula Cardona RN  Outcome: Progressing     Problem: PAIN - ADULT  Goal: Verbalizes/displays adequate comfort level or baseline comfort level  Description: Interventions:  - Encourage patient to monitor pain and request assistance  - Assess pain using appropriate pain scale  - Administer analgesics based on type and severity of pain and evaluate response  - Implement non-pharmacological measures as appropriate and evaluate response  - Consider cultural and social influences on pain and pain management  - Notify physician/advanced practitioner if interventions unsuccessful or patient reports new pain  12/3/2024 1220  by Lula Cardona RN  Outcome: Adequate for Discharge  12/3/2024 0934 by Lula Cardona RN  Outcome: Progressing     Problem: INFECTION - ADULT  Goal: Absence or prevention of progression during hospitalization  Description: INTERVENTIONS:  - Assess and monitor for signs and symptoms of infection  - Monitor lab/diagnostic results  - Monitor all insertion sites, i.e. indwelling lines, tubes, and drains  - Monitor endotracheal if appropriate and nasal secretions for changes in amount and color  - [place] appropriate cooling/warming therapies per order  - Administer medications as ordered  - Instruct and encourage patient and family to use good hand hygiene technique  - Identify and instruct in appropriate isolation precautions for identified infection/condition  12/3/2024 1220 by Lula Cardona RN  Outcome: Adequate for Discharge  12/3/2024 0934 by Lula Cardona RN  Outcome: Progressing     Problem: SAFETY ADULT  Goal: Patient will remain free of falls  Description: INTERVENTIONS:  - Educate patient/family on patient safety including physical limitations  - Instruct patient to call for assistance with activity   - Consult OT/PT to assist with strengthening/mobility   - Keep Call bell within reach  - Keep bed low and locked with side rails adjusted as appropriate  - Keep care items and personal belongings within reach  - Initiate and maintain comfort rounds    - Apply yellow socks and bracelet for high fall risk patients  - Consider moving patient to room near nurses station  12/3/2024 1220 by Lula Cardona RN  Outcome: Adequate for Discharge  12/3/2024 0934 by Lula Cardona RN  Outcome: Progressing  Goal: Maintain or return to baseline ADL function  Description: INTERVENTIONS:  -  Assess patient's ability to carry out ADLs; assess patient's baseline for ADL function and identify physical deficits which impact ability to perform ADLs (bathing, care of mouth/teeth, toileting, grooming, dressing,  etc.)  - Assess/evaluate cause of self-care deficits   - Assess range of motion  - Assess patient's mobility; develop plan if impaired  - Assess patient's need for assistive devices and provide as appropriate  - Encourage maximum independence but intervene and supervise when necessary  - Involve family in performance of ADLs  - Assess for home care needs following discharge   - Consider OT consult to assist with ADL evaluation and planning for discharge  - Provide patient education as appropriate  12/3/2024 1220 by Lula Cardona RN  Outcome: Adequate for Discharge  12/3/2024 0934 by Lula Cardona RN  Outcome: Progressing  Goal: Maintains/Returns to pre admission functional level  Description: INTERVENTIONS:  - Perform AM-PAC 6 Click Basic Mobility/ Daily Activity assessment daily.  - Set and communicate daily mobility goal to care team and patient/family/caregiver.   - Collaborate with rehabilitation services on mobility goals if consulted    - Record patient progress and toleration of activity level   12/3/2024 1220 by Lula Cardona RN  Outcome: Adequate for Discharge  12/3/2024 0934 by Lula Cardona RN  Outcome: Progressing     Problem: DISCHARGE PLANNING  Goal: Discharge to home or other facility with appropriate resources  Description: INTERVENTIONS:  - Identify barriers to discharge w/patient and caregiver  - Arrange for needed discharge resources and transportation as appropriate  - Identify discharge learning needs (meds, wound care, etc.)  - Arrange for interpretive services to assist at discharge as needed  - Refer to Case Management Department for coordinating discharge planning if the patient needs post-hospital services based on physician/advanced practitioner order or complex needs related to functional status, cognitive ability, or social support system  12/3/2024 1220 by Lula Cardona RN  Outcome: Adequate for Discharge  12/3/2024 0934 by Lula Cardona RN  Outcome: Progressing      Problem: Knowledge Deficit  Goal: Patient/family/caregiver demonstrates understanding of disease process, treatment plan, medications, and discharge instructions  Description: Complete learning assessment and assess knowledge base.  Interventions:  - Provide teaching at level of understanding  - Provide teaching via preferred learning methods  12/3/2024 1220 by Lula Cardona RN  Outcome: Adequate for Discharge  12/3/2024 0934 by Lula Cardona RN  Outcome: Progressing

## 2024-12-03 NOTE — INCIDENTAL FINDINGS
The following findings require follow up:  Radiographic finding   Finding: CTA chest pe study: No pulmonary embolism, Trace reflux of injected contrast into the intrahepatic IVC and proximal hepatic veins can be seen with a degree of pulmonary arterial hypertension., Groundglass nodularity demonstrated in the right middle/lower lobe as well as lingula., Findings consistent with an infectious/inflammatory infiltrate., 4 mm right upper lobe nodule., Borderline 3.9 cm fusiform ascending thoracic aortic ectasia., Recommend follow-up chest CT in 12 months.    Follow up required: yes   Follow up should be done within 1 week(s)    Please notify the following clinician to assist with the follow up:   Dr. Koko Lopez, DO     General - General Practice     [phone]       Incidental finding results were discussed with the Patient and Patient's POA by Ashley Powers MD on 12/03/24.   They expressed understanding and all questions answered.

## 2024-12-03 NOTE — DISCHARGE INSTR - OTHER ORDERS
Your goal pulse oximetry is 88 - 92%.  If your pulse ox is <88 - rest for 5 minutes and take deep breaths through your nose with the oxygen in place.  Make sure your finger is warm (cold fingers will give falsely low readings).  After 5 minutes, recheck your pulse ox.  If your pulse ox continues to be below < 88% and you feel short of breath, rest, breathe through your nose and call your primary care physician or pulmonologist 24/7 (if after office hours the answering service will contact the on call physician who will call you back).  If you continue to feel excessively short of breath (much more than your normal breathing pattern), consider further evaluation in the emergency department - remember that a mask must be worn to enter any [facility] Emergency Department.  If your pulse ox continues to be below < 88% and you do not feel short of breath increase your oxygen flow by 1 liter at a time to achieve a reading between 88 and 92%.  If you are needing more than 2 liters higher than your normal flow for more than a few hours call your primary care physician or pulmonologist, even if you are not feeling short of breath.  If your pulse ox is >92% it is safe to turn down oxygen by 1 liter at a time to achieve a reading of 88-92%.     YOU ARE RECOMMENDED 2L O2 AT REST AND 6L O2 UPON EXERTION.

## 2024-12-03 NOTE — ASSESSMENT & PLAN NOTE
Patient symptoms started 6 days ago, now flu positive-imaging shows right middle/lower lobe groundglass opacity-suspect superinfection postviral infection  Received IV antibiotic, prescribing p.o. antibiotic since patient signed AMA with recommendation to follow-up with PCP.

## 2024-12-03 NOTE — ASSESSMENT & PLAN NOTE
Meet criteria with tachycardia, tachypnea, with source of infection of pneumonia with recent history of influenza A  Imaging shows:Groundglass nodularity demonstrated in the right middle/lower lobe as well as lingula.  PE negative.  Flu positive-symptoms started 6 days ago, unable to start Tamiflu  Blood culture shows no growth in 24 hours, urine Legionella pneumonia negative.  Sputum culture is positive for multi organism.  Urine Legionella pneumonia negative, procalcitonin x 2 negative  Patient decided to leave AGAINST MEDICAL ADVICE and signed the form after discussion is done regarding risk versus benefits, side effects and alternative.  Patient's wife at the bedside.  Patient is discharging with p.o. antibiotic with follow-up with PCP.

## 2024-12-03 NOTE — ASSESSMENT & PLAN NOTE
Patient is saturating 88% on room air, currently using 2 L of oxygen to maintain adequate saturation  CTA PE negative but patient has pneumonia.  Also tested positive for flu  Patient has history of COPD  Continue treatment for COPD, pneumonia  Adjust oxygen as tolerated.  ABG showing compensating respiratory alkalosis.  Blood cultures so far no growth, urine Legionella pneumonia negative.  Sputum culture is growing multi organism.  Patient is going home with antibiotic.  Patient signed AGAINST MEDICAL ADVICE.  Home O2 evaluation done, patient is requiring 6 L of oxygen with ambulation, 2 L during rest-discussed in details with patient and his wife at the bedside regarding the safety.  Patient is still remain from on his decision and signed AMA form.

## 2024-12-03 NOTE — ASSESSMENT & PLAN NOTE
Per chart review, sodium level around 127  Patient came with sodium 127  Per patient also drinks 6 beers per day  Patient is status post 2 L of bolus per sepsis protocol-continue to monitor BMP every 4 hours to avoid rapid correction  Sodium improved

## 2024-12-03 NOTE — ASSESSMENT & PLAN NOTE
Came with acute respiratory failure with hypoxia, tested positive for flu A, also having pneumonia-all above is contributing exacerbation  Continue treatment per respiratory protocol, and a nebulizer of steroid,  Patient decided to sign AMA.  Home O2 oxygen evaluation done, patient does require 2 L of oxygen on rest 6 L during ambulation.  I discussed in details with patient and his wife at the bedside.  Verbalized understanding agrees.  Per patient, he is supposed to follow-up with pulmonologist but get frustrated since nobody get back from pulmonologist office and for that reason patient stopped following up with them and just follow-up with PCP at this time.

## 2024-12-04 LAB
BACTERIA SPT RESP CULT: ABNORMAL
BACTERIA SPT RESP CULT: ABNORMAL
GRAM STN SPEC: ABNORMAL

## 2024-12-05 ENCOUNTER — RESULTS FOLLOW-UP (OUTPATIENT)
Dept: OTHER | Facility: HOSPITAL | Age: 64
End: 2024-12-05

## 2024-12-05 LAB
DME PARACHUTE DELIVERY DATE ACTUAL: NORMAL
DME PARACHUTE DELIVERY DATE REQUESTED: NORMAL
DME PARACHUTE ITEM DESCRIPTION: NORMAL
DME PARACHUTE ORDER STATUS: NORMAL
DME PARACHUTE SUPPLIER NAME: NORMAL
DME PARACHUTE SUPPLIER PHONE: NORMAL

## 2024-12-06 LAB
BACTERIA BLD CULT: NORMAL
BACTERIA BLD CULT: NORMAL

## 2024-12-20 ENCOUNTER — HOSPITAL ENCOUNTER (OUTPATIENT)
Facility: CLINIC | Age: 64
Discharge: HOME | End: 2024-12-20
Payer: MEDICARE

## 2024-12-20 DIAGNOSIS — I73.9 PERIPHERAL VASCULAR DISEASE (HCC): ICD-10-CM

## 2024-12-20 PROCEDURE — 93923 UPR/LXTR ART STDY 3+ LVLS: CPT

## 2024-12-20 PROCEDURE — 93922 UPR/L XTREMITY ART 2 LEVELS: CPT | Performed by: SURGERY

## 2024-12-20 PROCEDURE — 93925 LOWER EXTREMITY STUDY: CPT | Performed by: SURGERY

## 2024-12-20 PROCEDURE — 93925 LOWER EXTREMITY STUDY: CPT

## 2024-12-31 PROBLEM — J18.9 PNEUMONIA: Status: RESOLVED | Noted: 2024-12-01 | Resolved: 2024-12-31

## 2024-12-31 PROBLEM — A41.9 SEPSIS (HCC): Status: RESOLVED | Noted: 2024-12-01 | Resolved: 2024-12-31

## 2024-12-31 PROBLEM — J10.1 INFLUENZA A: Status: RESOLVED | Noted: 2024-12-01 | Resolved: 2024-12-31

## 2025-01-01 ENCOUNTER — APPOINTMENT (EMERGENCY)
Dept: RADIOLOGY | Facility: HOSPITAL | Age: 65
End: 2025-01-01
Payer: MEDICARE

## 2025-01-01 ENCOUNTER — HOSPITAL ENCOUNTER (EMERGENCY)
Facility: HOSPITAL | Age: 65
Discharge: HOME | End: 2025-01-01
Attending: EMERGENCY MEDICINE | Admitting: EMERGENCY MEDICINE
Payer: MEDICARE

## 2025-01-01 VITALS
BODY MASS INDEX: 16.97 KG/M2 | OXYGEN SATURATION: 95 % | SYSTOLIC BLOOD PRESSURE: 169 MMHG | DIASTOLIC BLOOD PRESSURE: 87 MMHG | RESPIRATION RATE: 22 BRPM | WEIGHT: 121.69 LBS | HEART RATE: 106 BPM | TEMPERATURE: 98.8 F

## 2025-01-01 DIAGNOSIS — R06.00 DYSPNEA: Primary | ICD-10-CM

## 2025-01-01 DIAGNOSIS — J44.1 COPD WITH ACUTE EXACERBATION (HCC): ICD-10-CM

## 2025-01-01 LAB
ALBUMIN SERPL BCG-MCNC: 4.2 G/DL (ref 3.5–5)
ALP SERPL-CCNC: 119 U/L (ref 34–104)
ALT SERPL W P-5'-P-CCNC: 13 U/L (ref 7–52)
ANION GAP SERPL CALCULATED.3IONS-SCNC: 9 MMOL/L (ref 4–13)
AST SERPL W P-5'-P-CCNC: 16 U/L (ref 13–39)
BASOPHILS # BLD AUTO: 0.02 THOUSANDS/ΜL (ref 0–0.1)
BASOPHILS NFR BLD AUTO: 0 % (ref 0–1)
BILIRUB SERPL-MCNC: 0.35 MG/DL (ref 0.2–1)
BNP SERPL-MCNC: 87 PG/ML (ref 0–100)
BUN SERPL-MCNC: 8 MG/DL (ref 5–25)
CALCIUM SERPL-MCNC: 9.7 MG/DL (ref 8.4–10.2)
CARDIAC TROPONIN I PNL SERPL HS: 10 NG/L (ref ?–50)
CHLORIDE SERPL-SCNC: 93 MMOL/L (ref 96–108)
CO2 SERPL-SCNC: 26 MMOL/L (ref 21–32)
CREAT SERPL-MCNC: 0.81 MG/DL (ref 0.6–1.3)
EOSINOPHIL # BLD AUTO: 0 THOUSAND/ΜL (ref 0–0.61)
EOSINOPHIL NFR BLD AUTO: 0 % (ref 0–6)
ERYTHROCYTE [DISTWIDTH] IN BLOOD BY AUTOMATED COUNT: 14.6 % (ref 11.6–15.1)
FLUAV AG UPPER RESP QL IA.RAPID: NEGATIVE
FLUBV AG UPPER RESP QL IA.RAPID: NEGATIVE
GFR SERPL CREATININE-BSD FRML MDRD: 92 ML/MIN/1.73SQ M
GLUCOSE SERPL-MCNC: 160 MG/DL (ref 65–140)
HCT VFR BLD AUTO: 42.3 % (ref 36.5–49.3)
HGB BLD-MCNC: 15.1 G/DL (ref 12–17)
IMM GRANULOCYTES # BLD AUTO: 0.07 THOUSAND/UL (ref 0–0.2)
IMM GRANULOCYTES NFR BLD AUTO: 1 % (ref 0–2)
LACTATE SERPL-SCNC: 2.4 MMOL/L (ref 0.5–2)
LYMPHOCYTES # BLD AUTO: 0.59 THOUSANDS/ΜL (ref 0.6–4.47)
LYMPHOCYTES NFR BLD AUTO: 7 % (ref 14–44)
MAGNESIUM SERPL-MCNC: 1.6 MG/DL (ref 1.9–2.7)
MCH RBC QN AUTO: 33 PG (ref 26.8–34.3)
MCHC RBC AUTO-ENTMCNC: 35.7 G/DL (ref 31.4–37.4)
MCV RBC AUTO: 93 FL (ref 82–98)
MONOCYTES # BLD AUTO: 0.09 THOUSAND/ΜL (ref 0.17–1.22)
MONOCYTES NFR BLD AUTO: 1 % (ref 4–12)
NEUTROPHILS # BLD AUTO: 7.38 THOUSANDS/ΜL (ref 1.85–7.62)
NEUTS SEG NFR BLD AUTO: 91 % (ref 43–75)
NRBC BLD AUTO-RTO: 0 /100 WBCS
PLATELET # BLD AUTO: 378 THOUSANDS/UL (ref 149–390)
PMV BLD AUTO: 8.7 FL (ref 8.9–12.7)
POTASSIUM SERPL-SCNC: 4.2 MMOL/L (ref 3.5–5.3)
PROCALCITONIN SERPL-MCNC: <0.05 NG/ML
PROT SERPL-MCNC: 8 G/DL (ref 6.4–8.4)
RBC # BLD AUTO: 4.57 MILLION/UL (ref 3.88–5.62)
SARS-COV+SARS-COV-2 AG RESP QL IA.RAPID: NEGATIVE
SODIUM SERPL-SCNC: 128 MMOL/L (ref 135–147)
WBC # BLD AUTO: 8.15 THOUSAND/UL (ref 4.31–10.16)

## 2025-01-01 PROCEDURE — 87811 SARS-COV-2 COVID19 W/OPTIC: CPT | Performed by: EMERGENCY MEDICINE

## 2025-01-01 PROCEDURE — 96365 THER/PROPH/DIAG IV INF INIT: CPT

## 2025-01-01 PROCEDURE — 99285 EMERGENCY DEPT VISIT HI MDM: CPT

## 2025-01-01 PROCEDURE — 94640 AIRWAY INHALATION TREATMENT: CPT

## 2025-01-01 PROCEDURE — 99285 EMERGENCY DEPT VISIT HI MDM: CPT | Performed by: EMERGENCY MEDICINE

## 2025-01-01 PROCEDURE — 83605 ASSAY OF LACTIC ACID: CPT | Performed by: EMERGENCY MEDICINE

## 2025-01-01 PROCEDURE — 87070 CULTURE OTHR SPECIMN AEROBIC: CPT | Performed by: EMERGENCY MEDICINE

## 2025-01-01 PROCEDURE — 93005 ELECTROCARDIOGRAM TRACING: CPT

## 2025-01-01 PROCEDURE — 96375 TX/PRO/DX INJ NEW DRUG ADDON: CPT

## 2025-01-01 PROCEDURE — 84145 PROCALCITONIN (PCT): CPT | Performed by: EMERGENCY MEDICINE

## 2025-01-01 PROCEDURE — 83735 ASSAY OF MAGNESIUM: CPT | Performed by: EMERGENCY MEDICINE

## 2025-01-01 PROCEDURE — 87040 BLOOD CULTURE FOR BACTERIA: CPT | Performed by: EMERGENCY MEDICINE

## 2025-01-01 PROCEDURE — 87804 INFLUENZA ASSAY W/OPTIC: CPT | Performed by: EMERGENCY MEDICINE

## 2025-01-01 PROCEDURE — 87154 CUL TYP ID BLD PTHGN 6+ TRGT: CPT | Performed by: EMERGENCY MEDICINE

## 2025-01-01 PROCEDURE — 71046 X-RAY EXAM CHEST 2 VIEWS: CPT

## 2025-01-01 PROCEDURE — 83880 ASSAY OF NATRIURETIC PEPTIDE: CPT | Performed by: EMERGENCY MEDICINE

## 2025-01-01 PROCEDURE — 84484 ASSAY OF TROPONIN QUANT: CPT | Performed by: EMERGENCY MEDICINE

## 2025-01-01 PROCEDURE — 80053 COMPREHEN METABOLIC PANEL: CPT | Performed by: EMERGENCY MEDICINE

## 2025-01-01 PROCEDURE — 96367 TX/PROPH/DG ADDL SEQ IV INF: CPT

## 2025-01-01 PROCEDURE — 87205 SMEAR GRAM STAIN: CPT | Performed by: EMERGENCY MEDICINE

## 2025-01-01 PROCEDURE — 36415 COLL VENOUS BLD VENIPUNCTURE: CPT | Performed by: EMERGENCY MEDICINE

## 2025-01-01 PROCEDURE — 85025 COMPLETE CBC W/AUTO DIFF WBC: CPT | Performed by: EMERGENCY MEDICINE

## 2025-01-01 RX ORDER — DEXAMETHASONE SODIUM PHOSPHATE 10 MG/ML
6 INJECTION, SOLUTION INTRAMUSCULAR; INTRAVENOUS ONCE
Status: COMPLETED | OUTPATIENT
Start: 2025-01-01 | End: 2025-01-01

## 2025-01-01 RX ORDER — MAGNESIUM SULFATE HEPTAHYDRATE 40 MG/ML
2 INJECTION, SOLUTION INTRAVENOUS ONCE
Status: COMPLETED | OUTPATIENT
Start: 2025-01-01 | End: 2025-01-01

## 2025-01-01 RX ORDER — IPRATROPIUM BROMIDE AND ALBUTEROL SULFATE 2.5; .5 MG/3ML; MG/3ML
3 SOLUTION RESPIRATORY (INHALATION) ONCE
Status: COMPLETED | OUTPATIENT
Start: 2025-01-01 | End: 2025-01-01

## 2025-01-01 RX ORDER — CEFEPIME HYDROCHLORIDE 2 G/50ML
2000 INJECTION, SOLUTION INTRAVENOUS ONCE
Status: COMPLETED | OUTPATIENT
Start: 2025-01-01 | End: 2025-01-01

## 2025-01-01 RX ORDER — PREDNISONE 20 MG/1
20 TABLET ORAL 2 TIMES DAILY
Qty: 14 TABLET | Refills: 0 | Status: SHIPPED | OUTPATIENT
Start: 2025-01-01 | End: 2025-01-08

## 2025-01-01 RX ADMIN — DEXAMETHASONE SODIUM PHOSPHATE 6 MG: 10 INJECTION, SOLUTION INTRAMUSCULAR; INTRAVENOUS at 12:46

## 2025-01-01 RX ADMIN — MAGNESIUM SULFATE HEPTAHYDRATE 2 G: 40 INJECTION, SOLUTION INTRAVENOUS at 13:57

## 2025-01-01 RX ADMIN — CEFEPIME HYDROCHLORIDE 2000 MG: 2 INJECTION, SOLUTION INTRAVENOUS at 12:55

## 2025-01-01 RX ADMIN — IPRATROPIUM BROMIDE AND ALBUTEROL SULFATE 3 ML: .5; 3 SOLUTION RESPIRATORY (INHALATION) at 12:55

## 2025-01-01 NOTE — ED PROVIDER NOTES
Time reflects when diagnosis was documented in both MDM as applicable and the Disposition within this note       Time User Action Codes Description Comment    1/1/2025  2:26 PM Sylwia Gilman [R06.00] Dyspnea     1/1/2025  2:26 PM Sylwia Gilman [J44.1] COPD with acute exacerbation (HCC)           ED Disposition       ED Disposition   Discharge    Condition   Stable    Date/Time   Wed Jan 1, 2025  2:26 PM    Comment   Mihir Valdemar discharge to home/self care.                   Assessment & Plan       Medical Decision Making  This patient presents with symptoms most consistent with an acute COPD exacerbation.  The constellation of symptoms are similar to prior exacerbations.  The likely precipitant is viral respiratory infection versus weather change or air quality.  Low suspicion for alternate etiologies such as pneumothorax, acute PE, pneumonia.  Presentation not consistent with other acute cardiopulmonary causes including ACS, CHF.  Patient given DuoNeb, albuterol and IV steroids here with improvement of symptoms. No hypoxia noted.  Patient will be sent home with additional p.o. steroids and further breathing treatments/inhaler.   I did discussion with patient and wife at bedside regarding previous sputum culture results positive for Pseudomonas, patient currently taking Levaquin which shows some susceptibility based on the culture and sensitivity, however symptoms have not improved overall, patient declining admission at this time as he reports feeling much better after medications given thus far, he was advised to continue medications as previously prescribed and given strict return precautions with the expectation that he would likely be admitted to the hospital for IV antibiotic treatment if his symptoms worsen, patient and wife acknowledge understanding and agreement with this plan    Problems Addressed:  COPD with acute exacerbation (HCC): chronic illness or injury with exacerbation,  progression, or side effects of treatment  Dyspnea: chronic illness or injury with exacerbation, progression, or side effects of treatment    Amount and/or Complexity of Data Reviewed  Labs: ordered. Decision-making details documented in ED Course.  Radiology: ordered and independent interpretation performed. Decision-making details documented in ED Course.  ECG/medicine tests: ordered and independent interpretation performed. Decision-making details documented in ED Course.    Risk  Prescription drug management.        ED Course as of 01/01/25 1656   Wed Jan 01, 2025   1655 Sputum culture and Gram stain   1655 HS Troponin 0hr (reflex protocol)   1655 Procalcitonin   1655 B-Type Natriuretic Peptide(BNP)   1655 FLU/COVID Rapid Antigen (30 min. TAT) - Preferred screening test in ED   1655 Lactic acid, plasma (w/reflex if result > 2.0)(!)   1655 Magnesium(!)   1655 Comprehensive metabolic panel(!)   1655 CBC and differential(!)   1656 XR chest 2 views   1656 ECG 12 lead       Medications   ipratropium-albuterol (DUO-NEB) 0.5-2.5 mg/3 mL inhalation solution 3 mL (3 mL Nebulization Given 1/1/25 1255)   dexamethasone (PF) (DECADRON) injection 6 mg (6 mg Intravenous Given 1/1/25 1246)   cefepime (MAXIPIME) IVPB (premix in dextrose) 2,000 mg 50 mL (0 mg Intravenous Stopped 1/1/25 1329)   magnesium sulfate 2 g/50 mL IVPB (premix) 2 g (0 g Intravenous Stopped 1/1/25 1447)       ED Risk Strat Scores                          SBIRT 22yo+      Flowsheet Row Most Recent Value   Initial Alcohol Screen: US AUDIT-C     1. How often do you have a drink containing alcohol? 0 Filed at: 01/01/2025 1249   2. How many drinks containing alcohol do you have on a typical day you are drinking?  0 Filed at: 01/01/2025 1249   3a. Male UNDER 65: How often do you have five or more drinks on one occasion? 0 Filed at: 01/01/2025 1249   3b. FEMALE Any Age, or MALE 65+: How often do you have 4 or more drinks on one occassion? 0 Filed at: 01/01/2025  1249   Audit-C Score 0 Filed at: 2025 1246   ZEE: How many times in the past year have you...    Used an illegal drug or used a prescription medication for non-medical reasons? Never Filed at: 2025 1240                            History of Present Illness       Chief Complaint   Patient presents with    Shortness of Breath     Patient arrived with SOB worse with exertion. Cough with mucous. Patient recently admitted and treated for pneumonia.       Past Medical History:   Diagnosis Date    Atrial tachycardia (HCC)     BPH (benign prostatic hyperplasia)     COPD (chronic obstructive pulmonary disease) (HCC)     Hypertension     Osteoarthritis     Peripheral vascular disease (HCC)     stenting b/l iliac stenting 2019    Severe epistaxis       Past Surgical History:   Procedure Laterality Date    CAUTERIZE INNER NOSE Bilateral 2022    Procedure: CAUTERIZATION NASAL /SEPTAL;  Surgeon: Maximiliano Faulkner MD;  Location:  MAIN OR;  Service: ENT    HERNIA REPAIR      ILIAC VEIN ANGIOPLASTY / STENTING Bilateral 2019    Dr. Miller at [facility].    NOSE SURGERY      arterial clip due to epistaxis    SKIN CANCER EXCISION      VASECTOMY        Family History   Problem Relation Age of Onset    Lymphoma Mother     Colon cancer Father     Heart attack Brother     Heart attack Paternal Grandfather          59    Heart attack Brother     Substance Abuse Brother       Social History     Tobacco Use    Smoking status: Every Day     Current packs/day: 1.00     Types: Cigarettes    Smokeless tobacco: Never   Vaping Use    Vaping status: Never Used   Substance Use Topics    Alcohol use: Yes     Comment: 5-6 beers a day    Drug use: Never      E-Cigarette/Vaping    E-Cigarette Use Never User       E-Cigarette/Vaping Substances    Nicotine No     THC No     CBD No     Flavoring No     Other No     Unknown No       I have reviewed and agree with the history as documented.     Patient is a 64-year-old male with a history  of COPD, presenting to the emergency department complaining of shortness of breath, worsening since yesterday morning, he has a productive cough with clear phlegm, no fever, no chest pain, patient was admitted in November for COPD exacerbation, had positive sputum cultures which grew out Pseudomonas, at the time he was on on Augmentin, PCP placed him on Levaquin, instructions for Levaquin said not to take steroids, therefore he did not take his prednisone yesterday, he presented to urgent care this morning, reports he had a chest x-ray completed which showed pneumonia, patient was discharged with oxygen to use as needed but stated that his pulmonologist told him if his pulse ox was above 89% he should not use the oxygen, reports his pulse ox was always above 90 and therefore recently sent oxygen back        Review of Systems   Constitutional: Negative.    HENT: Negative.     Eyes: Negative.    Respiratory:  Positive for cough and shortness of breath.    Cardiovascular: Negative.    Gastrointestinal: Negative.    Endocrine: Negative.    Genitourinary: Negative.    Musculoskeletal: Negative.    Skin: Negative.    Allergic/Immunologic: Negative.    Neurological: Negative.    Hematological: Negative.    Psychiatric/Behavioral: Negative.             Objective       ED Triage Vitals   Temperature Pulse Blood Pressure Respirations SpO2 Patient Position - Orthostatic VS   01/01/25 1225 01/01/25 1225 01/01/25 1225 01/01/25 1225 01/01/25 1225 01/01/25 1225   98.8 °F (37.1 °C) (!) 117 (!) 186/120 20 94 % Sitting      Temp Source Heart Rate Source BP Location FiO2 (%) Pain Score    01/01/25 1225 01/01/25 1225 01/01/25 1330 -- --    Temporal Monitor Right arm        Vitals      Date and Time Temp Pulse SpO2 Resp BP Pain Score FACES Pain Rating User   01/01/25 1430 -- 106 95 % 22 169/87 -- -- RR   01/01/25 1330 -- 112 94 % 23 181/78 -- -- RR   01/01/25 1315 -- 115 93 % 24 178/78 -- -- SS   01/01/25 1300 -- 115 93 % 23 139/70 --  -- SS   01/01/25 1230 -- 115 95 % 24 174/77 -- -- SS   01/01/25 1225 98.8 °F (37.1 °C) 117 94 % 20 186/120 -- -- AS            Physical Exam  Constitutional:       Appearance: He is well-developed. He is ill-appearing.   HENT:      Head: Normocephalic and atraumatic.   Eyes:      Conjunctiva/sclera: Conjunctivae normal.      Pupils: Pupils are equal, round, and reactive to light.   Cardiovascular:      Rate and Rhythm: Normal rate.   Pulmonary:      Effort: Pulmonary effort is normal. Tachypnea present.      Breath sounds: Decreased breath sounds present.   Abdominal:      Palpations: Abdomen is soft.   Musculoskeletal:         General: Normal range of motion.      Cervical back: Normal range of motion and neck supple.   Skin:     General: Skin is warm and dry.   Neurological:      Mental Status: He is alert and oriented to person, place, and time.         Results Reviewed       Procedure Component Value Units Date/Time    Sputum culture and Gram stain [[id number]] Collected: 01/01/25 1436    Lab Status: In process Specimen: Expectorated Sputum Updated: 01/01/25 1446    HS Troponin 0hr (reflex protocol) [[id number]]  (Normal) Collected: 01/01/25 1248    Lab Status: Final result Specimen: Blood from Arm, Right Updated: 01/01/25 1326     hs TnI 0hr 10 ng/L     Procalcitonin [[id number]]  (Normal) Collected: 01/01/25 1248    Lab Status: Final result Specimen: Blood from Arm, Right Updated: 01/01/25 1326     Procalcitonin <0.05 ng/ml     B-Type Natriuretic Peptide(BNP) [[id number]]  (Normal) Collected: 01/01/25 1248    Lab Status: Final result Specimen: Blood from Arm, Right Updated: 01/01/25 1324     BNP 87 pg/mL     FLU/COVID Rapid Antigen (30 min. TAT) - Preferred screening test in ED [[id number]]  (Normal) Collected: 01/01/25 1248    Lab Status: Final result Specimen: Nares from Nose Updated: 01/01/25 1318     SARS COV Rapid Antigen Negative     Influenza A Rapid Antigen Negative     Influenza B Rapid Antigen Negative     Narrative:      This test has been performed using the Quidel Anika 2 FLU+SARS Antigen test under the Emergency Use Authorization (EUA). This test has been validated by the  and verified by the performing laboratory. The Anika uses lateral flow immunofluorescent sandwich assay to detect SARS-COV, Influenza A and Influenza B Antigen.     The Quidel Anika 2 SARS Antigen test does not differentiate between SARS-CoV and SARS-CoV-2.     Negative results are presumptive and may be confirmed with a molecular assay, if necessary, for patient management. Negative results do not rule out SARS-CoV-2 or influenza infection and should not be used as the sole basis for treatment or patient management decisions. A negative test result may occur if the level of antigen in a sample is below the limit of detection of this test.     Positive results are indicative of the presence of viral antigens, but do not rule out bacterial infection or co-infection with other viruses.     All test results should be used as an adjunct to clinical observations and other information available to the provider.    FOR PEDIATRIC PATIENTS - copy/paste COVID Guidelines URL to browser: [email or web address]    Lactic acid, plasma (w/reflex if result > 2.0) [[id number]]  (Abnormal) Collected: 01/01/25 1248    Lab Status: Final result Specimen: Blood from Arm, Right Updated: 01/01/25 1316     LACTIC ACID 2.4 mmol/L     Narrative:      Result may be elevated if tourniquet was used during collection.    Comprehensive metabolic panel [[id number]]  (Abnormal) Collected: 01/01/25 1248    Lab Status: Final result Specimen: Blood from Arm, Right Updated: 01/01/25 1315     Sodium 128 mmol/L      Potassium 4.2 mmol/L      Chloride 93 mmol/L      CO2 26 mmol/L      ANION GAP 9 mmol/L      BUN 8 mg/dL      Creatinine 0.81 mg/dL      Glucose 160 mg/dL      Calcium 9.7 mg/dL      AST 16 U/L      ALT 13 U/L       Alkaline Phosphatase 119 U/L      Total Protein 8.0 g/dL      Albumin 4.2 g/dL      Total Bilirubin 0.35 mg/dL      eGFR 92 ml/min/1.73sq m     Narrative:      National Kidney Disease Foundation guidelines for Chronic Kidney Disease (CKD):     Stage 1 with normal or high GFR (GFR > 90 mL/min/1.73 square meters)    Stage 2 Mild CKD (GFR = 60-89 mL/min/1.73 square meters)    Stage 3A Moderate CKD (GFR = 45-59 mL/min/1.73 square meters)    Stage 3B Moderate CKD (GFR = 30-44 mL/min/1.73 square meters)    Stage 4 Severe CKD (GFR = 15-29 mL/min/1.73 square meters)    Stage 5 End Stage CKD (GFR <15 mL/min/1.73 square meters)  Note: GFR calculation is accurate only with a steady state creatinine    Magnesium [[id number]]  (Abnormal) Collected: 01/01/25 1248    Lab Status: Final result Specimen: Blood from Arm, Right Updated: 01/01/25 1315     Magnesium 1.6 mg/dL     CBC and differential [[id number]]  (Abnormal) Collected: 01/01/25 1248    Lab Status: Final result Specimen: Blood from Arm, Right Updated: 01/01/25 1314     WBC 8.15 Thousand/uL      RBC 4.57 Million/uL      Hemoglobin 15.1 g/dL      Hematocrit 42.3 %      MCV 93 fL      MCH 33.0 pg      MCHC 35.7 g/dL      RDW 14.6 %      MPV 8.7 fL      Platelets 378 Thousands/uL      nRBC 0 /100 WBCs      Segmented % 91 %      Immature Grans % 1 %      Lymphocytes % 7 %      Monocytes % 1 %      Eosinophils Relative 0 %      Basophils Relative 0 %      Absolute Neutrophils 7.38 Thousands/µL      Absolute Immature Grans 0.07 Thousand/uL      Absolute Lymphocytes 0.59 Thousands/µL      Absolute Monocytes 0.09 Thousand/µL      Eosinophils Absolute 0.00 Thousand/µL      Basophils Absolute 0.02 Thousands/µL     Narrative:      This is an appended report.  These results have been appended to a previously verified report.    Blood culture #1 [[id number]] Collected: 01/01/25 1251    Lab Status: In process Specimen: Blood from Arm, Left Updated: 01/01/25 1303    Blood culture #2  [[id number]] Collected: 01/01/25 1248    Lab Status: In process Specimen: Blood from Arm, Right Updated: 01/01/25 1255            XR chest 2 views   ED Interpretation by Sylwia Gilman DO (01/01 1655)   Questionable faint infiltrate in right midlung          ECG 12 Lead Documentation Only    Date/Time: 1/1/2025 1:10 PM    Performed by: Sylwia Gilman DO  Authorized by: ySlwia Gilman DO    Indications / Diagnosis:  Shortness of breath  ECG reviewed by me, the ED Provider: yes    Patient location:  ED  Previous ECG:     Comparison to cardiac monitor: Yes    Interpretation:     Interpretation: abnormal    Rate:     ECG rate:  122    ECG rate assessment: tachycardic    Rhythm:     Rhythm: sinus rhythm    Ectopy:     Ectopy: none    QRS:     QRS intervals:  Normal  Conduction:     Conduction: normal    ST segments:     ST segments:  Normal  T waves:     T waves: normal        ED Medication and Procedure Management   Prior to Admission Medications   Prescriptions Last Dose Informant Patient Reported? Taking?   NON FORMULARY   Yes No   Sig: SHREYAS lung complex   albuterol (PROVENTIL HFA,VENTOLIN HFA) 90 mcg/act inhaler   No No   Sig: Inhale 2 puffs every 6 (six) hours as needed for wheezing   amLODIPine (NORVASC) 10 mg tablet   No No   Sig: Take 1 tablet (10 mg total) by mouth daily   folic acid (FOLVITE) 1 mg tablet   No No   Sig: Take 1 tablet (1 mg total) by mouth daily   guaiFENesin (MUCINEX) 600 mg 12 hr tablet   No No   Sig: Take 1 tablet (600 mg total) by mouth every 12 (twelve) hours   ipratropium-albuterol (DUO-NEB) 0.5-2.5 mg/3 mL nebulizer solution   No No   Sig: Take 3 mL by nebulization 4 (four) times a day   multivitamin (THERAGRAN) TABS   Yes No   Sig: Take 1 tablet by mouth daily   nicotine (NICODERM CQ) 21 mg/24 hr TD 24 hr patch   No No   Sig: Place 1 patch on the skin over 24 hours daily   thiamine 100 MG tablet   No No   Sig: Take 1 tablet (100 mg total) by mouth daily   tiotropium-olodaterol  (Stiolto Respimat) 2.5-2.5 MCG/ACT inhaler   No No   Sig: Inhale 2 puffs daily      Facility-Administered Medications: None     Discharge Medication List as of 1/1/2025  2:27 PM        START taking these medications    Details   predniSONE 20 mg tablet Take 1 tablet (20 mg total) by mouth 2 (two) times a day for 7 days, Starting Wed 1/1/2025, Until Wed 1/8/2025, Normal           CONTINUE these medications which have NOT CHANGED    Details   albuterol (PROVENTIL HFA,VENTOLIN HFA) 90 mcg/act inhaler Inhale 2 puffs every 6 (six) hours as needed for wheezing, Starting Tue 12/3/2024, Normal      amLODIPine (NORVASC) 10 mg tablet Take 1 tablet (10 mg total) by mouth daily, Starting Tue 12/3/2024, Until Thu 1/2/2025, Normal      folic acid (FOLVITE) 1 mg tablet Take 1 tablet (1 mg total) by mouth daily, Starting Wed 12/4/2024, Normal      guaiFENesin (MUCINEX) 600 mg 12 hr tablet Take 1 tablet (600 mg total) by mouth every 12 (twelve) hours, Starting Tue 12/3/2024, Normal      ipratropium-albuterol (DUO-NEB) 0.5-2.5 mg/3 mL nebulizer solution Take 3 mL by nebulization 4 (four) times a day, Starting Tue 12/3/2024, Normal      multivitamin (THERAGRAN) TABS Take 1 tablet by mouth daily, Historical Med      nicotine (NICODERM CQ) 21 mg/24 hr TD 24 hr patch Place 1 patch on the skin over 24 hours daily, Starting Wed 12/4/2024, Normal      NON FORMULARY SHREYAS lung complex, Historical Med      thiamine 100 MG tablet Take 1 tablet (100 mg total) by mouth daily, Starting Wed 12/4/2024, Normal      tiotropium-olodaterol (Stiolto Respimat) 2.5-2.5 MCG/ACT inhaler Inhale 2 puffs daily, Starting Tue 12/3/2024, Until Thu 1/2/2025, Normal           No discharge procedures on file.  ED SEPSIS DOCUMENTATION   Time reflects when diagnosis was documented in both MDM as applicable and the Disposition within this note       Time User Action Codes Description Comment    1/1/2025  2:26 PM Sylwia Gilman Add [R06.00] Dyspnea     1/1/2025  2:26  PM Sylwia Gilman Add [J44.1] COPD with acute exacerbation (HCC)                  Sylwia Gilman,   01/01/25 2854

## 2025-01-02 ENCOUNTER — RESULTS FOLLOW-UP (OUTPATIENT)
Dept: EMERGENCY DEPT | Facility: HOSPITAL | Age: 65
End: 2025-01-02

## 2025-01-02 LAB
ATRIAL RATE: 122 BPM
P AXIS: 85 DEGREES
PR INTERVAL: 132 MS
QRS AXIS: 72 DEGREES
QRSD INTERVAL: 76 MS
QT INTERVAL: 326 MS
QTC INTERVAL: 464 MS
T WAVE AXIS: 83 DEGREES
VENTRICULAR RATE: 122 BPM

## 2025-01-02 PROCEDURE — 93010 ELECTROCARDIOGRAM REPORT: CPT | Performed by: INTERNAL MEDICINE

## 2025-01-04 LAB
BACTERIA SPT RESP CULT: ABNORMAL
GRAM STN SPEC: ABNORMAL

## 2025-01-05 LAB
BACTERIA BLD CULT: ABNORMAL
GRAM STN SPEC: ABNORMAL
MECA+MECC ISLT/SPM QL: DETECTED
S EPIDERMIDIS DNA BLD POS QL NAA+NON-PRB: DETECTED

## 2025-01-07 LAB — BACTERIA BLD CULT: NORMAL

## 2025-08-19 ENCOUNTER — APPOINTMENT (OUTPATIENT)
Dept: RADIOLOGY | Facility: CLINIC | Age: 65
End: 2025-08-19
Payer: MEDICARE

## 2025-08-19 ENCOUNTER — OFFICE VISIT (OUTPATIENT)
Dept: URGENT CARE | Facility: CLINIC | Age: 65
End: 2025-08-19
Payer: MEDICARE

## 2025-08-19 ENCOUNTER — RESULTS FOLLOW-UP (OUTPATIENT)
Dept: URGENT CARE | Facility: CLINIC | Age: 65
End: 2025-08-19

## 2025-08-19 VITALS
HEART RATE: 96 BPM | SYSTOLIC BLOOD PRESSURE: 134 MMHG | RESPIRATION RATE: 18 BRPM | HEIGHT: 72 IN | DIASTOLIC BLOOD PRESSURE: 76 MMHG | WEIGHT: 115.4 LBS | OXYGEN SATURATION: 94 % | BODY MASS INDEX: 15.63 KG/M2 | TEMPERATURE: 95.8 F

## 2025-08-19 DIAGNOSIS — J22 LOWER RESPIRATORY INFECTION (E.G., BRONCHITIS, PNEUMONIA, PNEUMONITIS, PULMONITIS): Primary | ICD-10-CM

## 2025-08-19 DIAGNOSIS — J22 LOWER RESPIRATORY INFECTION (E.G., BRONCHITIS, PNEUMONIA, PNEUMONITIS, PULMONITIS): ICD-10-CM

## 2025-08-19 PROCEDURE — 99214 OFFICE O/P EST MOD 30 MIN: CPT

## 2025-08-19 PROCEDURE — 71046 X-RAY EXAM CHEST 2 VIEWS: CPT

## 2025-08-19 PROCEDURE — G0463 HOSPITAL OUTPT CLINIC VISIT: HCPCS

## 2025-08-19 RX ORDER — AZITHROMYCIN 250 MG/1
TABLET, FILM COATED ORAL
Qty: 6 TABLET | Refills: 0 | Status: SHIPPED | OUTPATIENT
Start: 2025-08-19 | End: 2025-08-23

## 2025-08-19 RX ORDER — PREDNISONE 10 MG/1
TABLET ORAL
Qty: 21 TABLET | Refills: 0 | Status: SHIPPED | OUTPATIENT
Start: 2025-08-19 | End: 2025-08-19

## 2025-08-19 RX ORDER — DOXYCYCLINE 100 MG/1
100 TABLET ORAL 2 TIMES DAILY
Qty: 14 TABLET | Refills: 0 | Status: SHIPPED | OUTPATIENT
Start: 2025-08-19 | End: 2025-08-19

## 2025-08-19 RX ORDER — PREDNISONE 20 MG/1
20 TABLET ORAL 2 TIMES DAILY WITH MEALS
Qty: 12 TABLET | Refills: 0 | Status: SHIPPED | OUTPATIENT
Start: 2025-08-19 | End: 2025-08-25

## (undated) DEVICE — SINGLE PORT MANIFOLD: Brand: NEPTUNE 2

## (undated) DEVICE — GLOVE INDICATOR PI UNDERGLOVE SZ 7.5 BLUE

## (undated) DEVICE — STERILE BETHLEHEM NASAL PACK: Brand: CARDINAL HEALTH

## (undated) DEVICE — NEEDLE 18 G X 1 1/2

## (undated) DEVICE — SPECIMEN CONTAINER STERILE PEEL PACK

## (undated) DEVICE — X-RAY DETECTABLE SPONGES,16 PLY: Brand: VISTEC

## (undated) DEVICE — MAYO STAND COVER: Brand: CONVERTORS

## (undated) DEVICE — MEDI-VAC YANKAUER SUCTION HANDLE W/STRAIGHT TIP & CONTROL VENT: Brand: CARDINAL HEALTH

## (undated) DEVICE — HEMOSTATIC MATRIX SURGIFLO 8ML W/THROMBIN

## (undated) DEVICE — NEURO PATTIES 1/2 X 1 1/2

## (undated) DEVICE — SYRINGE 10ML LL

## (undated) DEVICE — GAUZE SPONGES,16 PLY: Brand: CURITY

## (undated) DEVICE — TUBING SUCTION 5MM X 12 FT

## (undated) DEVICE — 4-PORT MANIFOLD: Brand: NEPTUNE 2

## (undated) DEVICE — SCD SEQUENTIAL COMPRESSION COMFORT SLEEVE MEDIUM KNEE LENGTH: Brand: KENDALL SCD

## (undated) DEVICE — NEEDLE 25G X 1 1/2